# Patient Record
Sex: FEMALE | Race: BLACK OR AFRICAN AMERICAN | Employment: UNEMPLOYED | URBAN - METROPOLITAN AREA
[De-identification: names, ages, dates, MRNs, and addresses within clinical notes are randomized per-mention and may not be internally consistent; named-entity substitution may affect disease eponyms.]

---

## 2019-02-08 ENCOUNTER — TELEPHONE (OUTPATIENT)
Dept: ENT CLINIC | Age: 49
End: 2019-02-08

## 2019-04-16 ENCOUNTER — OFFICE VISIT (OUTPATIENT)
Dept: ENT CLINIC | Age: 49
End: 2019-04-16
Payer: COMMERCIAL

## 2019-04-16 VITALS
HEART RATE: 99 BPM | WEIGHT: 293 LBS | HEIGHT: 68 IN | BODY MASS INDEX: 44.41 KG/M2 | SYSTOLIC BLOOD PRESSURE: 154 MMHG | DIASTOLIC BLOOD PRESSURE: 89 MMHG

## 2019-04-16 DIAGNOSIS — R09.89 SENSATION OF FOREIGN BODY IN THROAT: Primary | ICD-10-CM

## 2019-04-16 PROCEDURE — 99214 OFFICE O/P EST MOD 30 MIN: CPT | Performed by: OTOLARYNGOLOGY

## 2019-04-16 NOTE — PROGRESS NOTES
[address] [facility] ENT       PCP:  Bette Fagan MD       CHIEF COMPLAINT:  Chief Complaint   Patient presents with    Nose Problem       HISTORY OF PRESENT ILLNESS:   (Location, Quality, Severity, Duration, Timing, Context, Modifying factors, Associated symptoms)  Mikie Oviedo is a 50 y.o. female, here today for evaluation of a problem located in her nose and throat. The quality is a sensation of something stuck in her nose and throat. The severity is mild. The duration is 2 to 2.5 months. The timing is constant. The context is after eating an apple with a label on it. She stated that she ate an apple, that had a sticker on it. \"The sticker that was on the apple got stuck up in my nose. It floats from my nose to my throat. \"Sometimes my ear aches and then I cluck and it moves back down. \"         REVIEW OF SYSTEMS:  CONSTITUTIONAL:  Denied fever and chills. Denied unexplained weight loss. EARS, NOSE, THROAT:  Denied otorrhea, otalgia, hearing loss, tinnitus, epistaxis, nasal dyspnea, rhinorrhea, sore throat and chronic hoarseness. PAST MEDICAL HISTORY:   Past Medical History:   Diagnosis Date    Anemia     Hypertension     Respiratory infection     4/2015        Past Surgical History:   Procedure Laterality Date    APPENDECTOMY      BREAST SURGERY      reduction    HYSTERECTOMY, TOTAL ABDOMINAL  5/14/2015          EXAMINATION:     VITALS SIGNS:    Vitals:    04/16/19 1546 04/16/19 1549   BP: (!) 154/100 (!) 154/89   Pulse: 106 99   Weight: (!) 354 lb (160.6 kg)    Height: 5' 8\" (1.727 m)       GENERAL APPEARANCE: Well developed, well nourished, no apparent distress, no apparent deformities. ABILITY TO COMMUNICATE/QUALITY OF VOICE:  Communicated without difficulty. Normal voice. INSPECTION, HEAD AND FACE: Normal overall appearance, with no scars, lesions or masses. SINUSES:   The maxillary and frontal sinuses were nontender, bilaterally.          EXTERNAL EAR/NOSE:  Normal pinnae and mastoids. Normal external nose. EARS, OTOSCOPY: The TMs and EACs appeared to be normal bilaterally. NOSE:   The nasal septum was midline. The inferior turbinates were normal.  The nasal mucosa and secretions were normal.  No pus or polyps were seen. LIPS, TEETH AND GUMS:  Unremarkable. OROPHARYNX/ORAL CAVITY:  Oral mucosa, hard and soft palates, tongue, and pharynx were normal.   (+) INDIRECT LARYNGOSCOPY:  Visualization was suboptimal due to a strong gag reflex and guarding. The base of tongue and epiglottis appeared to be normal.  Unable to visualize the vocal cords and hypopharynx, and endolarynx. NECK:  No masses or tenderness. No abnormal appearance, asymmetry or abnormal tracheal position. Laryngeal cartilages and hyoid bone were normal to palpation. LYMPH NODES, CERVICAL, FACIAL AND SUPRACLAVICULAR:  No lymphadenopathy. THYROID:  No nodules, enlargement, tenderness or masses. CRANIAL NERVES:  II - XII intact, with no apparent deficits. IMPRESSION / Josie Cheung / Fang Rome / PROCEDURES:       Irean Landau was seen today for nose problem. Diagnoses and all orders for this visit:    Sensation of foreign body in throat        RECOMMENDATIONS / PLAN:   Return for flexible fiberoptic nasopharyngolaryngoscopy.          >> Please note portions of this note were completed with a voice recognition program.  Efforts were made to edit the dictations but occasionally words are mis-transcribed.

## 2022-06-10 ENCOUNTER — HOSPITAL ENCOUNTER (OUTPATIENT)
Age: 52
Setting detail: OBSERVATION
Discharge: HOME | End: 2022-06-13
Attending: INTERNAL MEDICINE | Admitting: INTERNAL MEDICINE
Payer: COMMERCIAL

## 2022-06-10 DIAGNOSIS — I44.2 CHB (COMPLETE HEART BLOCK) (HCC): Primary | ICD-10-CM

## 2022-06-10 PROBLEM — R73.9 ACUTE HYPERGLYCEMIA: Status: ACTIVE | Noted: 2022-06-10

## 2022-06-10 PROBLEM — E66.01 MORBID OBESITY WITH BMI OF 50.0-59.9, ADULT (HCC): Status: ACTIVE | Noted: 2022-06-10

## 2022-06-10 PROBLEM — Z91.14 NONCOMPLIANCE WITH MEDICATION REGIMEN: Status: ACTIVE | Noted: 2022-06-10

## 2022-06-10 PROBLEM — I10 HTN (HYPERTENSION): Status: ACTIVE | Noted: 2022-06-10

## 2022-06-10 PROBLEM — Z91.148 NONCOMPLIANCE WITH MEDICATION REGIMEN: Status: ACTIVE | Noted: 2022-06-10

## 2022-06-10 PROBLEM — R07.9 CHEST PAIN WITH MODERATE RISK FOR CARDIAC ETIOLOGY: Status: ACTIVE | Noted: 2022-06-10

## 2022-06-10 LAB
AMPHETAMINE SCREEN, URINE: NORMAL
BARBITURATE SCREEN URINE: NORMAL
BASOPHILS ABSOLUTE: 0.1 K/UL (ref 0–0.2)
BASOPHILS RELATIVE PERCENT: 0.6 %
BENZODIAZEPINE SCREEN, URINE: NORMAL
CANNABINOID SCREEN URINE: NORMAL
COCAINE METABOLITE SCREEN URINE: NORMAL
D DIMER: 0.33 UG/ML FEU (ref 0–0.6)
EOSINOPHILS ABSOLUTE: 0.2 K/UL (ref 0–0.6)
EOSINOPHILS RELATIVE PERCENT: 1.4 %
GLUCOSE BLD-MCNC: 161 MG/DL (ref 70–99)
GLUCOSE BLD-MCNC: 202 MG/DL (ref 70–99)
HCT VFR BLD CALC: 37 % (ref 36–48)
HEMOGLOBIN: 11.8 G/DL (ref 12–16)
LYMPHOCYTES ABSOLUTE: 2.8 K/UL (ref 1–5.1)
LYMPHOCYTES RELATIVE PERCENT: 21.1 %
Lab: NORMAL
MAGNESIUM: 1.6 MG/DL (ref 1.8–2.4)
MCH RBC QN AUTO: 26.6 PG (ref 26–34)
MCHC RBC AUTO-ENTMCNC: 31.8 G/DL (ref 31–36)
MCV RBC AUTO: 83.7 FL (ref 80–100)
METHADONE SCREEN, URINE: NORMAL
MONOCYTES ABSOLUTE: 0.7 K/UL (ref 0–1.3)
MONOCYTES RELATIVE PERCENT: 5 %
NEUTROPHILS ABSOLUTE: 9.6 K/UL (ref 1.7–7.7)
NEUTROPHILS RELATIVE PERCENT: 71.9 %
OPIATE SCREEN URINE: NORMAL
OXYCODONE URINE: NORMAL
PDW BLD-RTO: 16 % (ref 12.4–15.4)
PERFORMED ON: ABNORMAL
PERFORMED ON: ABNORMAL
PH UA: 6
PHENCYCLIDINE SCREEN URINE: NORMAL
PLATELET # BLD: 414 K/UL (ref 135–450)
PMV BLD AUTO: 7.2 FL (ref 5–10.5)
PROPOXYPHENE SCREEN: NORMAL
RBC # BLD: 4.42 M/UL (ref 4–5.2)
TROPONIN: <0.01 NG/ML
TROPONIN: <0.01 NG/ML
WBC # BLD: 13.4 K/UL (ref 4–11)

## 2022-06-10 PROCEDURE — 93005 ELECTROCARDIOGRAM TRACING: CPT | Performed by: HOSPITALIST

## 2022-06-10 PROCEDURE — 84439 ASSAY OF FREE THYROXINE: CPT

## 2022-06-10 PROCEDURE — 96372 THER/PROPH/DIAG INJ SC/IM: CPT

## 2022-06-10 PROCEDURE — 6360000002 HC RX W HCPCS: Performed by: HOSPITALIST

## 2022-06-10 PROCEDURE — 83735 ASSAY OF MAGNESIUM: CPT

## 2022-06-10 PROCEDURE — G0379 DIRECT REFER HOSPITAL OBSERV: HCPCS

## 2022-06-10 PROCEDURE — 83036 HEMOGLOBIN GLYCOSYLATED A1C: CPT

## 2022-06-10 PROCEDURE — 6370000000 HC RX 637 (ALT 250 FOR IP): Performed by: HOSPITALIST

## 2022-06-10 PROCEDURE — 96365 THER/PROPH/DIAG IV INF INIT: CPT

## 2022-06-10 PROCEDURE — 84443 ASSAY THYROID STIM HORMONE: CPT

## 2022-06-10 PROCEDURE — 96366 THER/PROPH/DIAG IV INF ADDON: CPT

## 2022-06-10 PROCEDURE — 84484 ASSAY OF TROPONIN QUANT: CPT

## 2022-06-10 PROCEDURE — 2580000003 HC RX 258: Performed by: HOSPITALIST

## 2022-06-10 PROCEDURE — 36415 COLL VENOUS BLD VENIPUNCTURE: CPT

## 2022-06-10 PROCEDURE — 85025 COMPLETE CBC W/AUTO DIFF WBC: CPT

## 2022-06-10 PROCEDURE — G0378 HOSPITAL OBSERVATION PER HR: HCPCS

## 2022-06-10 PROCEDURE — 80307 DRUG TEST PRSMV CHEM ANLYZR: CPT

## 2022-06-10 PROCEDURE — 85379 FIBRIN DEGRADATION QUANT: CPT

## 2022-06-10 RX ORDER — ACETAMINOPHEN 650 MG/1
650 SUPPOSITORY RECTAL EVERY 6 HOURS PRN
Status: DISCONTINUED | OUTPATIENT
Start: 2022-06-10 | End: 2022-06-13 | Stop reason: HOSPADM

## 2022-06-10 RX ORDER — DEXTROSE MONOHYDRATE 50 MG/ML
100 INJECTION, SOLUTION INTRAVENOUS PRN
Status: DISCONTINUED | OUTPATIENT
Start: 2022-06-10 | End: 2022-06-13 | Stop reason: HOSPADM

## 2022-06-10 RX ORDER — INSULIN LISPRO 100 [IU]/ML
0-12 INJECTION, SOLUTION INTRAVENOUS; SUBCUTANEOUS
Status: DISCONTINUED | OUTPATIENT
Start: 2022-06-10 | End: 2022-06-13 | Stop reason: HOSPADM

## 2022-06-10 RX ORDER — POLYETHYLENE GLYCOL 3350 17 G/17G
17 POWDER, FOR SOLUTION ORAL DAILY PRN
Status: DISCONTINUED | OUTPATIENT
Start: 2022-06-10 | End: 2022-06-13 | Stop reason: HOSPADM

## 2022-06-10 RX ORDER — ACETAMINOPHEN 325 MG/1
650 TABLET ORAL EVERY 6 HOURS PRN
Status: DISCONTINUED | OUTPATIENT
Start: 2022-06-10 | End: 2022-06-13 | Stop reason: HOSPADM

## 2022-06-10 RX ORDER — SODIUM CHLORIDE 0.9 % (FLUSH) 0.9 %
5-40 SYRINGE (ML) INJECTION PRN
Status: DISCONTINUED | OUTPATIENT
Start: 2022-06-10 | End: 2022-06-13 | Stop reason: HOSPADM

## 2022-06-10 RX ORDER — INSULIN GLARGINE 100 [IU]/ML
20 INJECTION, SOLUTION SUBCUTANEOUS NIGHTLY
Status: DISCONTINUED | OUTPATIENT
Start: 2022-06-10 | End: 2022-06-13 | Stop reason: HOSPADM

## 2022-06-10 RX ORDER — MAGNESIUM SULFATE IN WATER 40 MG/ML
2000 INJECTION, SOLUTION INTRAVENOUS ONCE
Status: COMPLETED | OUTPATIENT
Start: 2022-06-10 | End: 2022-06-10

## 2022-06-10 RX ORDER — LANOLIN ALCOHOL/MO/W.PET/CERES
400 CREAM (GRAM) TOPICAL DAILY
Status: DISCONTINUED | OUTPATIENT
Start: 2022-06-10 | End: 2022-06-13 | Stop reason: HOSPADM

## 2022-06-10 RX ORDER — POTASSIUM CHLORIDE 7.45 MG/ML
10 INJECTION INTRAVENOUS PRN
Status: DISCONTINUED | OUTPATIENT
Start: 2022-06-10 | End: 2022-06-13

## 2022-06-10 RX ORDER — ASPIRIN 81 MG/1
81 TABLET, CHEWABLE ORAL DAILY
Status: DISCONTINUED | OUTPATIENT
Start: 2022-06-11 | End: 2022-06-13 | Stop reason: HOSPADM

## 2022-06-10 RX ORDER — ENOXAPARIN SODIUM 100 MG/ML
40 INJECTION SUBCUTANEOUS 2 TIMES DAILY
Status: DISCONTINUED | OUTPATIENT
Start: 2022-06-10 | End: 2022-06-13 | Stop reason: HOSPADM

## 2022-06-10 RX ORDER — ONDANSETRON 4 MG/1
4 TABLET, ORALLY DISINTEGRATING ORAL EVERY 8 HOURS PRN
Status: DISCONTINUED | OUTPATIENT
Start: 2022-06-10 | End: 2022-06-13 | Stop reason: HOSPADM

## 2022-06-10 RX ORDER — INSULIN LISPRO 100 [IU]/ML
0-6 INJECTION, SOLUTION INTRAVENOUS; SUBCUTANEOUS NIGHTLY
Status: DISCONTINUED | OUTPATIENT
Start: 2022-06-10 | End: 2022-06-13 | Stop reason: HOSPADM

## 2022-06-10 RX ORDER — POTASSIUM CHLORIDE 20 MEQ/1
40 TABLET, EXTENDED RELEASE ORAL PRN
Status: DISCONTINUED | OUTPATIENT
Start: 2022-06-10 | End: 2022-06-13

## 2022-06-10 RX ORDER — SODIUM CHLORIDE 9 MG/ML
INJECTION, SOLUTION INTRAVENOUS PRN
Status: DISCONTINUED | OUTPATIENT
Start: 2022-06-10 | End: 2022-06-13 | Stop reason: HOSPADM

## 2022-06-10 RX ORDER — SODIUM CHLORIDE 0.9 % (FLUSH) 0.9 %
5-40 SYRINGE (ML) INJECTION EVERY 12 HOURS SCHEDULED
Status: DISCONTINUED | OUTPATIENT
Start: 2022-06-10 | End: 2022-06-13 | Stop reason: HOSPADM

## 2022-06-10 RX ORDER — AMLODIPINE BESYLATE 5 MG/1
5 TABLET ORAL DAILY
Status: ON HOLD | COMMUNITY
End: 2022-06-13 | Stop reason: HOSPADM

## 2022-06-10 RX ORDER — ONDANSETRON 2 MG/ML
4 INJECTION INTRAMUSCULAR; INTRAVENOUS EVERY 6 HOURS PRN
Status: DISCONTINUED | OUTPATIENT
Start: 2022-06-10 | End: 2022-06-13 | Stop reason: HOSPADM

## 2022-06-10 RX ADMIN — MAGNESIUM SULFATE HEPTAHYDRATE 2000 MG: 40 INJECTION, SOLUTION INTRAVENOUS at 20:59

## 2022-06-10 RX ADMIN — INSULIN LISPRO 2 UNITS: 100 INJECTION, SOLUTION INTRAVENOUS; SUBCUTANEOUS at 17:56

## 2022-06-10 RX ADMIN — Medication 400 MG: at 21:02

## 2022-06-10 RX ADMIN — Medication 10 ML: at 20:42

## 2022-06-10 RX ADMIN — INSULIN LISPRO 2 UNITS: 100 INJECTION, SOLUTION INTRAVENOUS; SUBCUTANEOUS at 21:03

## 2022-06-10 RX ADMIN — ENOXAPARIN SODIUM 40 MG: 100 INJECTION SUBCUTANEOUS at 21:07

## 2022-06-10 RX ADMIN — ACETAMINOPHEN 650 MG: 325 TABLET ORAL at 18:35

## 2022-06-10 RX ADMIN — INSULIN GLARGINE 20 UNITS: 100 INJECTION, SOLUTION SUBCUTANEOUS at 21:02

## 2022-06-10 RX ADMIN — SODIUM CHLORIDE: 9 INJECTION, SOLUTION INTRAVENOUS at 19:00

## 2022-06-10 ASSESSMENT — LIFESTYLE VARIABLES: HOW OFTEN DO YOU HAVE A DRINK CONTAINING ALCOHOL: NEVER

## 2022-06-10 ASSESSMENT — PAIN DESCRIPTION - DESCRIPTORS: DESCRIPTORS: ACHING

## 2022-06-10 NOTE — LETTER
[facility]  [place] [facility] [employer]  [address] [address]  Dept: [phone]  Loc: [phone]                                                                     [facility]   [address] [place] [address]         Marshall Stock was admitted to [facility] on 6/10/2022 for treatment and evaluation. It is my medical opinion that patient may return to work on 6/15/2022.     Sincerely, Adam Shaw MD  [phone]

## 2022-06-10 NOTE — H&P
HOSPITALISTS HISTORY AND PHYSICAL    6/10/2022 5:20 PM    Patient Information:  Komal Rodrigez is a 46 y.o. female [id number]  PCP:  Eder Gutiérrez MD (Tel: [phone] )    Chief complaint:  No chief complaint on file. History of Present Illness:  Yuliya Johnson is a 46 y.o. female who arrived via transport from the [facility] to be evaluated for acute chest pain with witnessed CHB/4-second pause. She reports that her symptoms began yesterday with intermittent right-sided substernal chest pressure. She endorses diaphoresis, nausea, and anxiety. Patient with multiple cardiac risk factors including: Morbid obesity with BMI 54.74, HTN with med noncompliance, and acute hyperglycemia concerning for type II DM. History obtained from patient and review of Epic [facility]    REVIEW OF SYSTEMS:   Constitutional: Negative for fever,chills; positive generalized weakness  ENT: Negative for headache, rhinorrhea, and sore throat.   Respiratory: Positive for shortness of breath; denies wheezing, and cough  Cardiovascular: Positive right-sided  chest pain; denies palpitations; 2+ peripheral edema with orthopnea  Gastrointestinal: Chronic GERD symptoms; negative for N/V/D and abdominal pain; no hematemesis, hematochezia, or melena; no anorexia  Genitourinary: Nocturnal urinary frequency; negative for dysuria, frequency, retention; no incontinence  Hematologic/Lymphatic: Negative for bleeding tendency/excessive bruising  Musculoskeletal: Negative for myalgias and arthalgias; able to ambulate without difficulty  Neurologic: Negative for LOC, seizure activity, paresthesias, dysarthria, vertigo, and gait disturbance  Skin: Negative for itching,rash, decubitus  Psychiatric: Negative for depression,anxiety, and agitation; no hallucinations; denies SI/HI  Endocrine: Denies type II DM; positive polydipsia/polyphagia, polyuria    Past Medical History:   has a past medical history of Anemia, Hypertension, and Respiratory infection. Past Surgical History:   has a past surgical history that includes Breast surgery; Appendectomy; and Hysterectomy, total abdominal (5/14/2015). Medications:  No current facility-administered medications on file prior to encounter. Current Outpatient Medications on File Prior to Encounter   Medication Sig Dispense Refill    amLODIPine (NORVASC) 5 MG tablet Take 5 mg by mouth daily         Allergies: Allergies   Allergen Reactions    Promethazine Hives    Vistaril [Hydroxyzine Pamoate] Hives        Social History:   reports that she has never smoked. She has never used smokeless tobacco. She reports that she does not drink alcohol and does not use drugs. Family History:  family history includes Arthritis in an other family member; High Blood Pressure in her father, mother, and another family member.      Physical Exam:  Ht 5' 8\" (1.727 m)   Wt (!) 360 lb (163.3 kg)   LMP 04/19/2015   BMI 54.74 kg/m²     General appearance: Pleasant, morbidly obese female resting in bed, NAD  Eyes: Sclera clear without conjunctival injection; PERRLA; EOMI  ENT: Mucous membranes moist without thrush; normal dentition  Neck: Supple without meningismus; no goiter; no carotid bruit bilaterally  Cardiovascular: Regular rhythm without ectopy; normal S1-S2 with no murmurs; no peripheral edema; no JVD  Respiratory: No tachypnea; CTAB with adequate air exchange, no wheeze, rhonchi or rales; I:E intact  Gastrointestinal: Abdomen obese, non-tender, not distended; bowel sounds normal; no masses/organomegaly appreciated  Musculoskeletal: FROM spine and extremities x4; no gross deformity  Neurology: A&O x3; cranial nerves 2-12 grossly intact; motor 5/5  BUE/BLE; no seizure activity; finger-to-nose/heel-to-shin intact; no pronator drift  Psychiatry: Well-groomed with good eye contact; appropriate affect; no visual/auditory hallucination  Skin: Warm, dry, normal turgor, no rash  PV: 2/4 radial and dorsalis pedis bilaterally; brisk capillary refill    Labs:  CBC:   Lab Results   Component Value Date    WBC 15.8 05/15/2015    RBC 3.48 05/15/2015    HGB 8.0 05/15/2015    HCT 26.4 05/15/2015    MCV 75.7 05/15/2015    MCH 22.9 05/15/2015    MCHC 30.2 05/15/2015    RDW 17.8 05/15/2015     05/15/2015    MPV 7.5 05/15/2015     BMP:    Lab Results   Component Value Date     05/08/2015    K 4.2 05/08/2015     05/08/2015    CO2 24 05/08/2015    BUN 9 05/08/2015    CREATININE 0.7 05/08/2015    CALCIUM 9.5 05/08/2015    GFRAA >60 05/08/2015    LABGLOM >60 05/08/2015    GLUCOSE 108 05/08/2015     No orders to display         EKG:    Ventricular Rate 89 P BPM QTc Calculation (Bazett) 457 P ms   Atrial Rate 89 P BPM P Axis 48 P degrees   P-R Interval 158 P ms R Axis -2 P degrees   QRS Duration 94 P ms T Axis 25 P degrees   Q-T Interval 376 P ms Diagnosis Normal sinus rhythmInferior infarct (cited on or before 10-PAYAL-2022       I visualized CXR images and EKG strips personally and agree with documented interpretation    Discussed case  with ED provider    Problem List:  Principal Problem:    CHB (complete heart block) (HCC)  Active Problems: Morbid obesity with BMI of 50.0-59.9, adult (HCC)    Acute hyperglycemia    HTN (hypertension)    Noncompliance with medication regimen    LPRD (laryngopharyngeal reflux disease)  Resolved Problems:    * No resolved hospital problems.  *        Consults:  IP CONSULT TO CARDIOLOGY      Assessment/Plan:     Chest pain with elevated HEART score  -Admit to telemetry floor with serial cardiac enzymes to be obtained overnight  -ASA administered in ED and to be continued at 81 mg PO QAM  -High dose statin therapy initiated overnight; obtain FLP in a.m.  -Nitropaste applied to chest wall (BP permitting)  -ECHO scheduled for a.m.  -Lexiscan nuclear stress testing scheduled for a.m.  -Therapeutic anticoagulation not indicated at this time  -Consultation placed to cardiologist for further recommendations    CHB with 4-second pause  -Patient completely asymptomatic when this occurred  - UDS, thyroid studies and electrolytes pending  -Patient to be monitored via telemetry during stay; pacer pads to be applied to chest wall with transcutaneous pacer near bedside  -Consult placed to EP CARDS for further recommendations    HTN  -Patient admitted to telemetry floor for continuous monitoring during stay  -EKG obtained in ED reviewed personally and found to be without evidence of LAD or acute ischemia  -Patient reports that she is prescribed Norvasc but has been noncompliant for greater than 1 year  -Patient may benefit from addition of ACE RI due to suspicion for occult diabetes  -ECHO scheduled in a.m. to further assess cardiac structure and function    Acute hyperglycemia with BMI 54.74  -Random POC glucose 257 highly suggestive of occult DM; A1c ordered with results pending  -Patient denies being diabetic and is on no oral hypoglycemic agents  -Weight-based basal insulin initiated QHS  -PRN Humalog medium dose SSI scheduled before meals and at bedtime based on POC glucose  -Carbohydrate restriction placed on diet  -Consultation placed to Nutrition for ADA dietary education    DVT prophylaxis-Lovenox 30 mg subcu twice daily  Code status-full code  Diet-cardiac KIANA with carb restrictions  IV access-PIV established in ED      Admit as observation. I anticipate hospitalization spanning less than two midnights for investigation and treatment of the above medically necessary diagnoses. Comment: Please note this report has been produced using speech recognition software and may contain errors related to that system including errors in grammar, punctuation, and spelling, as well as words and phrases that may be inappropriate.  If there are any questions or concerns please feel free to contact the dictating provider for clarification.          Yaquelin Vazquez MD    6/10/2022 5:20 PM

## 2022-06-10 NOTE — PROGRESS NOTES
Patient admitted to room from ED. report received. Patient oriented to room, call light, bed rails, phone, lights and bathroom. Patient instructed about schedule of the day including:  Vital sign, frequency, lab draws, possible tests, frequency of MD and staff rounds. Patient instructed about precribed diet, how/when to call for meal service, and television. Telemetry box in place, patient aware of placement and reason. Bed locked, in lowest position, side rails up 2/4, call light within reach.

## 2022-06-10 NOTE — PROGRESS NOTES
4 Eyes Skin Assessment     NAME:  Libia Ku  YOB: 1970  MEDICAL RECORD NUMBER:  [id number]    The patient is being assess for  Admission    I agree that 2 RN's have performed a thorough Head to Toe Skin Assessment on the patient. ALL assessment sites listed below have been assessed. Areas assessed by both nurses:    Head, Face, Ears, Shoulders, Back, Chest, Arms, Elbows, Hands, Sacrum. Buttock, Coccyx, Ischium and Legs. Feet and Heels        Does the Patient have a Wound?  No noted wound(s)       Sudarshan Prevention initiated:  NA   Wound Care Orders initiated:  NA    Pressure Injury (Stage 3,4, Unstageable, DTI, NWPT, and Complex wounds) if present place consult order under [de-identified] NA    New and Established Ostomies if present place consult order under : NA      Nurse 1 eSignature: Electronically signed by Josie Rose RN on 6/10/22 at 7:21 PM EDT    **SHARE this note so that the co-signing nurse is able to place an eSignature**    Nurse 2 eSignature: Electronically signed by Chelsie Parikh RN on 6/10/22 at 7:42 PM EDT

## 2022-06-10 NOTE — PROGRESS NOTES
Pharmacist Review and Automatic Dose Adjustment of Prophylactic Enoxaparin    *Review reason for admission/hospital problem list*    The reviewing pharmacist has made an adjustment to the ordered enoxaparin dose or converted to UFH per the approved [facility] protocol and table as identified below. Deana Dumont is a 46 y.o. female. No results for input(s): CREATININE in the last 72 hours. CrCl cannot be calculated (Patient's most recent lab result is older than the maximum 180 days allowed. ). Recent Labs     06/10/22  1718   HGB 11.8*   HCT 37.0        No results for input(s): INR in the last 72 hours. Height:   Ht Readings from Last 1 Encounters:   06/10/22 5' 8\" (1.727 m)     Weight:  Wt Readings from Last 1 Encounters:   06/10/22 (!) 360 lb (163.3 kg)               Plan: Based upon the patient's weight and renal function, the ordered enoxaparin dose of 30 mg twice daily has been changed to 40 mg twice daily.       Thank you,  Florian Lambert, [facility]  6/10/2022, 6:08 PM

## 2022-06-11 ENCOUNTER — APPOINTMENT (OUTPATIENT)
Dept: NUCLEAR MEDICINE | Age: 52
End: 2022-06-11
Attending: INTERNAL MEDICINE
Payer: COMMERCIAL

## 2022-06-11 LAB
ANION GAP SERPL CALCULATED.3IONS-SCNC: 8 MMOL/L (ref 3–16)
BUN BLDV-MCNC: 7 MG/DL (ref 7–20)
CALCIUM SERPL-MCNC: 9.3 MG/DL (ref 8.3–10.6)
CHLORIDE BLD-SCNC: 97 MMOL/L (ref 99–110)
CHOLESTEROL, TOTAL: 215 MG/DL (ref 0–199)
CO2: 32 MMOL/L (ref 21–32)
CREAT SERPL-MCNC: <0.5 MG/DL (ref 0.6–1.1)
EKG ATRIAL RATE: 88 BPM
EKG ATRIAL RATE: 89 BPM
EKG DIAGNOSIS: NORMAL
EKG DIAGNOSIS: NORMAL
EKG P AXIS: 48 DEGREES
EKG P AXIS: 53 DEGREES
EKG P-R INTERVAL: 158 MS
EKG P-R INTERVAL: 168 MS
EKG Q-T INTERVAL: 376 MS
EKG Q-T INTERVAL: 376 MS
EKG QRS DURATION: 84 MS
EKG QRS DURATION: 94 MS
EKG QTC CALCULATION (BAZETT): 454 MS
EKG QTC CALCULATION (BAZETT): 457 MS
EKG R AXIS: -18 DEGREES
EKG R AXIS: -2 DEGREES
EKG T AXIS: 25 DEGREES
EKG T AXIS: 25 DEGREES
EKG VENTRICULAR RATE: 88 BPM
EKG VENTRICULAR RATE: 89 BPM
ESTIMATED AVERAGE GLUCOSE: 237.4 MG/DL
GFR AFRICAN AMERICAN: >60
GFR NON-AFRICAN AMERICAN: >60
GLUCOSE BLD-MCNC: 167 MG/DL (ref 70–99)
GLUCOSE BLD-MCNC: 205 MG/DL (ref 70–99)
GLUCOSE BLD-MCNC: 225 MG/DL (ref 70–99)
GLUCOSE BLD-MCNC: 250 MG/DL (ref 70–99)
GLUCOSE BLD-MCNC: 340 MG/DL (ref 70–99)
HBA1C MFR BLD: 9.9 %
HCT VFR BLD CALC: 38.4 % (ref 36–48)
HDLC SERPL-MCNC: 43 MG/DL (ref 40–60)
HEMOGLOBIN: 12.2 G/DL (ref 12–16)
LDL CHOLESTEROL CALCULATED: 148 MG/DL
LV EF: 60 %
LVEF MODALITY: NORMAL
MCH RBC QN AUTO: 26.5 PG (ref 26–34)
MCHC RBC AUTO-ENTMCNC: 31.6 G/DL (ref 31–36)
MCV RBC AUTO: 83.7 FL (ref 80–100)
PDW BLD-RTO: 15.7 % (ref 12.4–15.4)
PERFORMED ON: ABNORMAL
PLATELET # BLD: 399 K/UL (ref 135–450)
PMV BLD AUTO: 7.2 FL (ref 5–10.5)
POTASSIUM SERPL-SCNC: 4.8 MMOL/L (ref 3.5–5.1)
RBC # BLD: 4.59 M/UL (ref 4–5.2)
SODIUM BLD-SCNC: 137 MMOL/L (ref 136–145)
T4 FREE: 1.3 NG/DL (ref 0.9–1.8)
TRIGL SERPL-MCNC: 121 MG/DL (ref 0–150)
TSH SERPL DL<=0.05 MIU/L-ACNC: 1.16 UIU/ML (ref 0.27–4.2)
VLDLC SERPL CALC-MCNC: 24 MG/DL
WBC # BLD: 11.5 K/UL (ref 4–11)

## 2022-06-11 PROCEDURE — 93010 ELECTROCARDIOGRAM REPORT: CPT | Performed by: INTERNAL MEDICINE

## 2022-06-11 PROCEDURE — 96372 THER/PROPH/DIAG INJ SC/IM: CPT

## 2022-06-11 PROCEDURE — 85027 COMPLETE CBC AUTOMATED: CPT

## 2022-06-11 PROCEDURE — 78452 HT MUSCLE IMAGE SPECT MULT: CPT

## 2022-06-11 PROCEDURE — 36415 COLL VENOUS BLD VENIPUNCTURE: CPT

## 2022-06-11 PROCEDURE — 93005 ELECTROCARDIOGRAM TRACING: CPT | Performed by: HOSPITALIST

## 2022-06-11 PROCEDURE — 2580000003 HC RX 258: Performed by: HOSPITALIST

## 2022-06-11 PROCEDURE — 6360000002 HC RX W HCPCS: Performed by: INTERNAL MEDICINE

## 2022-06-11 PROCEDURE — 93017 CV STRESS TEST TRACING ONLY: CPT

## 2022-06-11 PROCEDURE — 3430000000 HC RX DIAGNOSTIC RADIOPHARMACEUTICAL: Performed by: INTERNAL MEDICINE

## 2022-06-11 PROCEDURE — 99205 OFFICE O/P NEW HI 60 MIN: CPT | Performed by: INTERNAL MEDICINE

## 2022-06-11 PROCEDURE — A9502 TC99M TETROFOSMIN: HCPCS | Performed by: INTERNAL MEDICINE

## 2022-06-11 PROCEDURE — 6360000002 HC RX W HCPCS: Performed by: HOSPITALIST

## 2022-06-11 PROCEDURE — 80048 BASIC METABOLIC PNL TOTAL CA: CPT

## 2022-06-11 PROCEDURE — 80061 LIPID PANEL: CPT

## 2022-06-11 PROCEDURE — 6370000000 HC RX 637 (ALT 250 FOR IP): Performed by: INTERNAL MEDICINE

## 2022-06-11 PROCEDURE — 6370000000 HC RX 637 (ALT 250 FOR IP): Performed by: HOSPITALIST

## 2022-06-11 PROCEDURE — G0378 HOSPITAL OBSERVATION PER HR: HCPCS

## 2022-06-11 RX ORDER — AMLODIPINE BESYLATE 5 MG/1
5 TABLET ORAL DAILY
Status: DISCONTINUED | OUTPATIENT
Start: 2022-06-11 | End: 2022-06-13

## 2022-06-11 RX ADMIN — ASPIRIN 81 MG: 81 TABLET, CHEWABLE ORAL at 08:19

## 2022-06-11 RX ADMIN — TETROFOSMIN 30.1 MILLICURIE: 1.38 INJECTION, POWDER, LYOPHILIZED, FOR SOLUTION INTRAVENOUS at 11:00

## 2022-06-11 RX ADMIN — Medication 10 ML: at 08:19

## 2022-06-11 RX ADMIN — ENOXAPARIN SODIUM 40 MG: 100 INJECTION SUBCUTANEOUS at 22:09

## 2022-06-11 RX ADMIN — ENOXAPARIN SODIUM 40 MG: 100 INJECTION SUBCUTANEOUS at 08:19

## 2022-06-11 RX ADMIN — Medication 10 ML: at 22:14

## 2022-06-11 RX ADMIN — Medication 400 MG: at 08:19

## 2022-06-11 RX ADMIN — AMLODIPINE BESYLATE 5 MG: 5 TABLET ORAL at 16:04

## 2022-06-11 RX ADMIN — INSULIN GLARGINE 20 UNITS: 100 INJECTION, SOLUTION SUBCUTANEOUS at 22:09

## 2022-06-11 RX ADMIN — INSULIN LISPRO 8 UNITS: 100 INJECTION, SOLUTION INTRAVENOUS; SUBCUTANEOUS at 17:38

## 2022-06-11 RX ADMIN — INSULIN LISPRO 2 UNITS: 100 INJECTION, SOLUTION INTRAVENOUS; SUBCUTANEOUS at 22:10

## 2022-06-11 RX ADMIN — REGADENOSON 0.4 MG: 0.08 INJECTION, SOLUTION INTRAVENOUS at 11:00

## 2022-06-11 ASSESSMENT — ENCOUNTER SYMPTOMS: TACHYPNEA: 1

## 2022-06-11 NOTE — PROGRESS NOTES
Hospitalist Progress Note      PCP: Teresa Maki MD    Date of Admission: 6/10/2022    Chief Complaint: chest pain    Hospital Course: h and p reviewed     Subjective: no CP /SOB or syncope       Medications:  Reviewed    Infusion Medications    sodium chloride 25 mL/hr at 06/10/22 1900    dextrose       Scheduled Medications    sodium chloride flush  5-40 mL IntraVENous 2 times per day    aspirin  81 mg Oral Daily    enoxaparin  40 mg SubCUTAneous BID    insulin lispro  0-12 Units SubCUTAneous TID WC    insulin lispro  0-6 Units SubCUTAneous Nightly    insulin glargine  20 Units SubCUTAneous Nightly    magnesium oxide  400 mg Oral Daily     PRN Meds: sodium chloride flush, sodium chloride, ondansetron **OR** ondansetron, acetaminophen **OR** acetaminophen, polyethylene glycol, perflutren lipid microspheres, potassium chloride **OR** potassium alternative oral replacement **OR** potassium chloride, glucose, dextrose bolus **OR** dextrose bolus, glucagon (rDNA), dextrose      Intake/Output Summary (Last 24 hours) at 6/11/2022 0915  Last data filed at 6/10/2022 2005  Gross per 24 hour   Intake 120 ml   Output --   Net 120 ml       Physical Exam Performed:    BP (!) 143/90   Pulse 96   Temp 98 °F (36.7 °C) (Oral)   Resp 20   Ht 5' 8\" (1.727 m)   Wt (!) 360 lb (163.3 kg)   LMP 04/19/2015   SpO2 95%   BMI 54.74 kg/m²     General appearance: No apparent distress, appears stated age and cooperative. HEENT:. Conjunctivae/corneas clear. Neck: Supple, with full range of motion. No jugular venous distention. Trachea midline. Respiratory:  Normal respiratory effort. Clear to auscultation, bilaterally without Rales/Wheezes/Rhonchi. Cardiovascular: Regular rate and rhythm with normal S1/S2 without murmurs, rubs or gallops. Abdomen: Soft, non-tender, non-distended with normal bowel sounds. Musculoskeletal: No clubbing, cyanosis or edema bilaterally. Full range of motion without deformity.   Skin: Skin color, texture, turgor normal.  No rashes or lesions. Neurologic:  Neurovascularly intact without any focal sensory/motor deficits. .  Psychiatric: Alert and oriented, thought content appropriate, normal insight  Capillary Refill: Brisk,3 seconds, normal   Peripheral Pulses: +2 palpable, equal bilaterally       Labs:   Recent Labs     06/10/22  1718 06/11/22  0637   WBC 13.4* 11.5*   HGB 11.8* 12.2   HCT 37.0 38.4    399     Recent Labs     06/11/22 0637      K 4.8   CL 97*   CO2 32   BUN 7   CREATININE <0.5*   CALCIUM 9.3     No results for input(s): AST, ALT, BILIDIR, BILITOT, ALKPHOS in the last 72 hours. No results for input(s): INR in the last 72 hours.   Recent Labs     06/10/22  1718 06/10/22  2008   TROPONINI <0.01 <0.01       Urinalysis:    No results found for: Mijares Goldmann, BACTERIA, RBCUA, BLOODU, SPECGRAV, Veren[address] [place] [address]    Radiology:  NM Cardiac Stress Test Nuclear Imaging    (Results Pending)           Assessment/Plan:    Active Hospital Problems    Diagnosis     CHB (complete heart block) ([facility] [address] [I44.2]      Priority: Medium    Morbid obesity with BMI of 50.0-59.9, [facility] [E66.01, Z68.43]      Priority: Medium    Acute hyperglycemia [R73.9]      Priority: Medium    HTN (hypertension) [I10]      Priority: Medium    Noncompliance with medication regimen [Z91.14]      Priority: Medium    Chest pain with moderate risk for cardiac etiology [R07.9]      Priority: Medium    LPRD (laryngopharyngeal reflux disease) [K21.9]      Chest pain with elevated HEART score- currently asymptomatic   -Admit to telemetry floor with serial cardiac enzymes to be obtained overnight  -ASA administered in ED and to be continued at 81 mg PO QAM  -High dose statin therapy initiated overnight; obtain FLP in a.m.  -Nitropaste applied to chest wall (BP permitting)  -ECHO scheduled for a.m.  -Lexiscan nuclear stress testing scheduled for a.m.  -Therapeutic anticoagulation not indicated at this time  -Consultation placed to cardiologist for further recommendations     CHB with 4-second pause  -Patient completely asymptomatic when this occurred  - UDS, thyroid studies and electrolytes pending  -Patient to be monitored via telemetry during stay; pacer pads to be applied to chest wall with transcutaneous pacer near bedside  -Consult placed to EP CARDS for further recommendations     HTN  -Patient admitted to telemetry floor for continuous monitoring during stay  -EKG obtained in ED reviewed personally and found to be without evidence of LAD or acute ischemia  -Patient reports that she is prescribed Norvasc but has been noncompliant for greater than 1 year  -Patient may benefit from addition of ACE RI due to suspicion for occult diabetes  -ECHO scheduled in a.m. to further assess cardiac structure and function     Acute hyperglycemia with BMI 54.74  -Random POC glucose 257 highly suggestive of occult DM; A1c ordered with results pending  -Patient denies being diabetic and is on no oral hypoglycemic agents  -Weight-based basal insulin initiated QHS  -PRN Humalog medium dose SSI scheduled before meals and at bedtime based on POC glucose  -Carbohydrate restriction placed on diet  -Consultation placed to Nutrition for ADA dietary education    DVT Prophylaxis: Lovenox   Diet: Diet NPO  Diet NPO  Code Status: Full Code    PT/OT Eval Status:     Dispo - pt may stay for 2 nd part of stress test till Kimmy Gaston MD

## 2022-06-11 NOTE — CONSULTS
Consult placed    [facility], [address]  Date:6/11/2022,  Time:7:40 AM        Electronically signed by Ney Ceballos on 6/11/2022 at 7:40 AM

## 2022-06-11 NOTE — PROGRESS NOTES
Stress Lab: Pt states she cannot walk on TM - GXT Myoview converted to a [place]. Due to [facility] protocol, the test needs to be divided into 2 days - MD, Pt, and Staff aware. Stress portion of test completed today, will complete resting portion of test Monday 6/13 a.m. Pt tolerated stress portion of test well.

## 2022-06-12 LAB
GLUCOSE BLD-MCNC: 187 MG/DL (ref 70–99)
GLUCOSE BLD-MCNC: 192 MG/DL (ref 70–99)
GLUCOSE BLD-MCNC: 194 MG/DL (ref 70–99)
GLUCOSE BLD-MCNC: 284 MG/DL (ref 70–99)
PERFORMED ON: ABNORMAL

## 2022-06-12 PROCEDURE — 6370000000 HC RX 637 (ALT 250 FOR IP): Performed by: HOSPITALIST

## 2022-06-12 PROCEDURE — 6360000002 HC RX W HCPCS: Performed by: INTERNAL MEDICINE

## 2022-06-12 PROCEDURE — 2580000003 HC RX 258: Performed by: HOSPITALIST

## 2022-06-12 PROCEDURE — 6370000000 HC RX 637 (ALT 250 FOR IP): Performed by: INTERNAL MEDICINE

## 2022-06-12 PROCEDURE — 96375 TX/PRO/DX INJ NEW DRUG ADDON: CPT

## 2022-06-12 PROCEDURE — G0378 HOSPITAL OBSERVATION PER HR: HCPCS

## 2022-06-12 PROCEDURE — 6360000002 HC RX W HCPCS: Performed by: HOSPITALIST

## 2022-06-12 PROCEDURE — 96372 THER/PROPH/DIAG INJ SC/IM: CPT

## 2022-06-12 RX ORDER — HYDRALAZINE HYDROCHLORIDE 20 MG/ML
10 INJECTION INTRAMUSCULAR; INTRAVENOUS EVERY 6 HOURS PRN
Status: DISCONTINUED | OUTPATIENT
Start: 2022-06-12 | End: 2022-06-13 | Stop reason: HOSPADM

## 2022-06-12 RX ADMIN — Medication 400 MG: at 08:03

## 2022-06-12 RX ADMIN — Medication 10 ML: at 08:06

## 2022-06-12 RX ADMIN — ENOXAPARIN SODIUM 40 MG: 100 INJECTION SUBCUTANEOUS at 20:14

## 2022-06-12 RX ADMIN — INSULIN LISPRO 2 UNITS: 100 INJECTION, SOLUTION INTRAVENOUS; SUBCUTANEOUS at 12:44

## 2022-06-12 RX ADMIN — ENOXAPARIN SODIUM 40 MG: 100 INJECTION SUBCUTANEOUS at 08:03

## 2022-06-12 RX ADMIN — INSULIN GLARGINE 20 UNITS: 100 INJECTION, SOLUTION SUBCUTANEOUS at 22:33

## 2022-06-12 RX ADMIN — INSULIN LISPRO 3 UNITS: 100 INJECTION, SOLUTION INTRAVENOUS; SUBCUTANEOUS at 22:33

## 2022-06-12 RX ADMIN — ASPIRIN 81 MG: 81 TABLET, CHEWABLE ORAL at 08:03

## 2022-06-12 RX ADMIN — INSULIN LISPRO 2 UNITS: 100 INJECTION, SOLUTION INTRAVENOUS; SUBCUTANEOUS at 09:21

## 2022-06-12 RX ADMIN — AMLODIPINE BESYLATE 5 MG: 5 TABLET ORAL at 08:03

## 2022-06-12 RX ADMIN — HYDRALAZINE HYDROCHLORIDE 10 MG: 20 INJECTION INTRAMUSCULAR; INTRAVENOUS at 17:10

## 2022-06-12 RX ADMIN — INSULIN LISPRO 2 UNITS: 100 INJECTION, SOLUTION INTRAVENOUS; SUBCUTANEOUS at 17:50

## 2022-06-12 RX ADMIN — Medication 10 ML: at 20:15

## 2022-06-12 NOTE — PROGRESS NOTES
Hospitalist Progress Note      PCP: Efra Nunez MD    Date of Admission: 6/10/2022    Chief Complaint: chest pain    Hospital Course: h and p reviewed     Subjective: no CP /SOB or syncope       Medications:  Reviewed    Infusion Medications    sodium chloride 25 mL/hr at 06/10/22 1900    dextrose       Scheduled Medications    amLODIPine  5 mg Oral Daily    sodium chloride flush  5-40 mL IntraVENous 2 times per day    aspirin  81 mg Oral Daily    enoxaparin  40 mg SubCUTAneous BID    insulin lispro  0-12 Units SubCUTAneous TID WC    insulin lispro  0-6 Units SubCUTAneous Nightly    insulin glargine  20 Units SubCUTAneous Nightly    magnesium oxide  400 mg Oral Daily     PRN Meds: sodium chloride flush, sodium chloride, ondansetron **OR** ondansetron, acetaminophen **OR** acetaminophen, polyethylene glycol, perflutren lipid microspheres, potassium chloride **OR** potassium alternative oral replacement **OR** potassium chloride, glucose, dextrose bolus **OR** dextrose bolus, glucagon (rDNA), dextrose      Intake/Output Summary (Last 24 hours) at 6/12/2022 0853  Last data filed at 6/11/2022 1830  Gross per 24 hour   Intake 240 ml   Output --   Net 240 ml       Physical Exam Performed:    BP (!) 143/76   Pulse 86   Temp 98.3 °F (36.8 °C) (Oral)   Resp 22   Ht 5' 8\" (1.727 m)   Wt (!) 362 lb (164.2 kg)   LMP 04/19/2015   SpO2 95%   BMI 55.04 kg/m²     General appearance: No apparent distress, appears stated age and cooperative. HEENT:. Conjunctivae/corneas clear. Neck: Supple, with full range of motion. No jugular venous distention. Trachea midline. Respiratory:  Normal respiratory effort. Clear to auscultation, bilaterally without Rales/Wheezes/Rhonchi. Cardiovascular: Regular rate and rhythm with normal S1/S2 without murmurs, rubs or gallops. Abdomen: Soft, non-tender, non-distended with normal bowel sounds. Musculoskeletal: No clubbing, cyanosis or edema bilaterally.   Full range of motion without deformity. Skin: Skin color, texture, turgor normal.  No rashes or lesions. Neurologic:  Neurovascularly intact without any focal sensory/motor deficits. .  Psychiatric: Alert and oriented, thought content appropriate, normal insight  Capillary Refill: Brisk,3 seconds, normal   Peripheral Pulses: +2 palpable, equal bilaterally       Labs:   Recent Labs     06/10/22  1718 06/11/22  0637   WBC 13.4* 11.5*   HGB 11.8* 12.2   HCT 37.0 38.4    399     Recent Labs     06/11/22  0637      K 4.8   CL 97*   CO2 32   BUN 7   CREATININE <0.5*   CALCIUM 9.3     No results for input(s): AST, ALT, BILIDIR, BILITOT, ALKPHOS in the last 72 hours. No results for input(s): INR in the last 72 hours.   Recent Labs     06/10/22  1718 06/10/22  2008   TROPONINI <0.01 <0.01       Urinalysis:    No results found for: Britton Parent, BACTERIA, RBCUA, BLOODU, SPECGRAV, Verenice [place] [address]    Radiology:  NM Cardiac Stress Test Nuclear Imaging    (Results Pending)           Assessment/Plan:    Active Hospital Problems    Diagnosis     CHB (complete heart block) ([facility] [I44.2]      Priority: Medium    Morbid obesity with BMI of 50.0-59.9, adult ([facility] [E66.01, Z68.43]      Priority: Medium    Acute hyperglycemia [R73.9]      Priority: Medium    HTN (hypertension) [I10]      Priority: Medium    Noncompliance with medication regimen [Z91.14]      Priority: Medium    Chest pain with moderate risk for cardiac etiology [R07.9]      Priority: Medium    LPRD (laryngopharyngeal reflux disease) [K21.9]      Chest pain with elevated HEART score- currently asymptomatic   -Admit to telemetry floor with serial cardiac enzymes to be obtained overnight  -ASA administered in ED and to be continued at 81 mg PO QAM  -High dose statin therapy initiated overnight; obtain FLP in a.m.  -Nitropaste applied to chest wall (BP permitting)  -ECHO scheduled for a.m.  -Lexiscan nuclear stress testing  Part I 6/11 - part 2 - 6/13 .  -Therapeutic anticoagulation not indicated at this time  -Consultation placed to cardiologist , input appreciated      CHB with 4-second pause   -Patient completely asymptomatic when this occurred  - UDS- neg , thyroid studies and electrolytes - WNL   -Patient to be monitored via telemetry during stay; pacer pads to be applied to chest wall with transcutaneous pacer near bedside  -Consult placed to EP CARDS for further recommendations  Cardio input appreciated    intermittent Mobitz type 1 AV block  No  evidence of Mobitz type 2 or third-degree AV block.    -Will continue to avoid AV-isaura blocking agents for now     HTN  -Patient admitted to telemetry floor for continuous monitoring during stay  -EKG obtained in ED reviewed personally and found to be without evidence of LAD or acute ischemia  -Patient reports that she is prescribed Norvasc but has been noncompliant for greater than 1 year  -Patient may benefit from addition of ACE RI due to suspicion for occult diabetes- consider after ischemia w/u   hydralazine prn   -ECHO scheduled      Acute hyperglycemia with BMI 54.74 - newly diagnosed DM   - ; A1c  9.9   -Patient denies being diabetic and is on no oral hypoglycemic agents  -Weight-based basal insulin initiated QHS  -PRN Humalog medium dose SSI scheduled before meals and at bedtime based on POC glucose  -Carbohydrate restriction placed on diet  -Consultation placed to Nutrition for ADA dietary education   - may need antidiabetic meds on dc     DVT Prophylaxis: Lovenox   Diet: ADULT DIET; Regular; 4 carb choices (60 gm/meal); Low Fat/Low Chol/High Fiber/2 gm Na;  No Caffeine  Diet NPO  Code Status: Full Code    PT/OT Eval Status:     Dispo - pt may stay for 2 nd part of stress test till Monday 6/13     Ofelia Martell MD

## 2022-06-12 NOTE — CARE COORDINATION
CASE MANAGEMENT INITIAL ASSESSMENT      Reviewed chart and completed assessment with patient:bedside  Family present: none  Explained Case Management role/services. Primary contact information:[facility] Decision Maker :   Primary Decision Maker: Ezequiel Vences - Domestic Partner - [phone]          Can this person be reached and be able to respond quickly, such as within a few minutes or hours? Yes      Admit date/status:6/10/22  Diagnosis:CHB   Is this a Readmission?:  No      Insurance:none listed, reported has BCBS   Precert required for SNF: Yes       3 night stay required: No    Living arrangements, Adls, care needs, prior to admission:lives in ranch style home with SO. [address] at home:  Walker__Cane__RTS__ BSC__Shower Chair__  02__ HHN__ CPAP__  BiPap__  Hospital Bed__ W/C___ Other_____    Services in the home and/or outpatient, prior to admission:none    Current PCP:none currently provided PCP information                                Medications Prescription coverage? yes    Transportation needs: none     PT/OT recs:none    Hospital Exemption Notification (HEN):needed for SNF    Barriers to discharge:none    Plan/comments:spoke with patient. Plans on returning home at d/c. IPTA. Will be able to get to ay follow up appts. Denied any DCP needs.  Needs ECHO and Lexiscan in am. Daisy Maldonado, RN       ECOC on chart for MD signature

## 2022-06-12 NOTE — PLAN OF CARE
Problem: Discharge Planning  Goal: Discharge to home or other facility with appropriate resources  6/12/2022 0053 by Amanda Gonzalez RN  Outcome: Progressing  6/11/2022 1754 by Frank Martinez RN  Outcome: Progressing     Problem: Neurosensory - Adult  Goal: Achieves stable or improved neurological status  Outcome: Progressing     Problem: Respiratory - Adult  Goal: Achieves optimal ventilation and oxygenation  Outcome: Progressing     Problem: Cardiovascular - Adult  Goal: Maintains optimal cardiac output and hemodynamic stability  Outcome: Progressing  Goal: Absence of cardiac dysrhythmias or at baseline  Outcome: Progressing     Problem: Skin/Tissue Integrity - Adult  Goal: Skin integrity remains intact  Outcome: Progressing     Problem: Genitourinary - Adult  Goal: Absence of urinary retention  Outcome: Progressing     Problem: Hematologic - Adult  Goal: Maintains hematologic stability  Outcome: Progressing

## 2022-06-12 NOTE — CONSULTS
[facility]  [phone]      Attending Physician: Ez Bullock MD  Reason for Consultation/Chief Complaint: Chest pain, AV block    Subjective   History of Present Illness:  Javi Ayoub is a 46 y.o. female with a history of morbid obesity and essential hypertension transferred for further evaluation of chest pain and AV block. The patient works at the [address] [place] [address] and says that while sitting at work yesterday, she developed sudden onset dull substernal chest pain. She denies any associated lightheadedness, dizziness, palpitations, or shortness of breath. A co-worker checked her blood pressure and she says that it was elevated. She says that she was given IV labetalol and while being monitored on telemetry was noted to have a 4-second pause. On review of her available telemetry strips from the [address] [place] [address] it appears that she had some intermittent Mobitz type 1 AV block and had a period with 3 consecutive non-conducted P waves. She again denied any associated lightheadedness, dizziness, or syncope. She was subsequently transferred to [address] [place] [address] for further evaluation. Since her arrival, she has been hemodynamically stable and has not had any recurrent chest pain. Review of her telemetry shows brief intermittent Mobitz I  AV block. There has not been any other evidence of Mobitz II AV block or complete heart block. Her troponins have been negative and EKG is without ischemic changes. Past Medical History:   has a past medical history of Anemia, Hypertension, and Respiratory infection. Surgical History:   has a past surgical history that includes Breast surgery; Appendectomy; and Hysterectomy, total abdominal (5/14/2015). Social History:   reports that she has never smoked. She has never used smokeless tobacco. She reports that she does not drink alcohol and does not use drugs.      Family History:  family history includes Arthritis in an other family member; High Blood Pressure in her father, mother, and another family member. Home Medications:  Were reviewed and are listed in nursing record and/or below  Prior to Admission medications    Medication Sig Start Date End Date Taking? Authorizing Provider   amLODIPine (NORVASC) 5 MG tablet Take 5 mg by mouth daily   Yes Historical Provider, MD        CURRENT Medications:  amLODIPine (NORVASC) tablet 5 mg, Daily  sodium chloride flush 0.9 % injection 5-40 mL, 2 times per day  sodium chloride flush 0.9 % injection 5-40 mL, PRN  0.9 % sodium chloride infusion, PRN  ondansetron (ZOFRAN-ODT) disintegrating tablet 4 mg, Q8H PRN   Or  ondansetron (ZOFRAN) injection 4 mg, Q6H PRN  acetaminophen (TYLENOL) tablet 650 mg, Q6H PRN   Or  acetaminophen (TYLENOL) suppository 650 mg, Q6H PRN  polyethylene glycol (GLYCOLAX) packet 17 g, Daily PRN  aspirin chewable tablet 81 mg, Daily  perflutren lipid microspheres (DEFINITY) injection 1.65 mg, ONCE PRN  potassium chloride (KLOR-CON M) extended release tablet 40 mEq, PRN   Or  potassium bicarb-citric acid (EFFER-K) effervescent tablet 40 mEq, PRN   Or  potassium chloride 10 mEq/100 mL IVPB (Peripheral Line), PRN  enoxaparin (LOVENOX) injection 40 mg, BID  insulin lispro (HUMALOG) injection vial 0-12 Units, TID WC  insulin lispro (HUMALOG) injection vial 0-6 Units, Nightly  glucose chewable tablet 16 g, PRN  dextrose bolus 10% 125 mL, PRN   Or  dextrose bolus 10% 250 mL, PRN  glucagon (rDNA) injection 1 mg, PRN  dextrose 5 % solution, PRN  insulin glargine (LANTUS) injection vial 20 Units, Nightly  magnesium oxide (MAG-OX) tablet 400 mg, Daily        Allergies:  Promethazine and Vistaril [hydroxyzine pamoate]     Review of Systems:   A 14 point review of symptoms was completed. Pertinent positives were identified in the HPI. All other review of symptoms negative unless otherwise noted below.       Objective   PHYSICAL EXAM:    Vitals:    6/11/22   BP: 131/83   Pulse: 89   Resp: 20 Temp: 98.2 °F   SpO2: 94%    Weight: (!) 362 lb (164.2 kg)       General: Morbidly obese adult female in no acute distress. Pleasant and interactive on exam.  HEENT: Normocephalic, atraumatic, non-icteric, hearing intact, nares normal, mucous membranes moist.  Neck: Supple, trachea midline. No adenopathy. No thyromegaly. No JVD. Heart: Regular rate and rhythm. Normal S1 and S2. No murmurs, gallops or rubs. Lungs: Normal respiratory effort. Clear to auscultation bilaterally. No wheezes, rales, or rhonchi. Abdomen: Soft, non-tender. Normoactive bowel sounds. No masses or organomegaly. Skin: No rashes, wounds, or lesions. Pulses: 2+ and symmetric. Extremities: No clubbing, cyanosis, or edema. Musculoskeletal: Spontaneously moves all four extremities. Psych: Normal mood and affect. Neuro: Alert and oriented to person, place, and time. No focal deficits noted. Labs   CBC:   Lab Results   Component Value Date    WBC 11.5 06/11/2022    RBC 4.59 06/11/2022    HGB 12.2 06/11/2022    HCT 38.4 06/11/2022    MCV 83.7 06/11/2022    RDW 15.7 06/11/2022     06/11/2022     CMP:  Lab Results   Component Value Date     06/11/2022    K 4.8 06/11/2022    CL 97 06/11/2022    CO2 32 06/11/2022    BUN 7 06/11/2022    CREATININE <0.5 06/11/2022    GFRAA >60 06/11/2022    LABGLOM >60 06/11/2022    GLUCOSE 250 06/11/2022    CALCIUM 9.3 06/11/2022     PT/INR:  No results found for: PTINR  HgBA1c:  Lab Results   Component Value Date    LABA1C 9.9 06/10/2022     Lab Results   Component Value Date    TROPONINI <0.01 06/10/2022         Cardiac Data     Last EKG: Normal sinus rhythm, poor R wave progression in anterior leads    Echo: N/A    Stress Test: N/A    Cath: N/A      Assessment and Plan      1. Atypical chest pain - Although chest pain is atypical, patient does have multiple risk factors for coronary artery disease and further cardiac risk stratification with non-invasive stress testing is reasonable.   She has ruled out for acute coronary syndrome.  -Will arrange for GXT nuclear SPECT stress test - Will have to be completed as two-day study due to body habitus  -Can continue aspirin 81 mg daily  -Repeat EKG for any recurrent episodes of chest pain    2. Intermittent Mobitz type 1 AV block - Asymptomatic. There was initial concern for complete heart block at the [place] prior to transfer. On my review of her available rhythm strips, it appears that she has had intermittent Mobitz type 1 AV block and did have a period with 3 consecutive non-conducted P-waves, but has not had any other evidence of Mobitz type 2 or third-degree AV block.    -Will continue to avoid AV-isaura blocking agents for now  -Planning for ischemic evaluation as above  -Continue to monitor on telemetry while inpatient for evidence of high-degree AV block and pending clinical course, can arrange for additional ambulatory monitoring with cardiac event monitor at time of discharge    3. Essential hypertension  -Continue amlodipine 5 mg daily    4. Morbid obesity  -Will benefit from weight loss through both dietary modifications and regular physical exercise      Will follow-up after stress test has been completed. Thank you for allowing us to participate in the care of Libia Ku. Please call me with any questions [phone]. [facility]  [phone] [facility]  [phone] [address] [place] [address]  6/12/2022 12:18 PM      I will address the patient's cardiac risk factors and adjusted pharmacologic treatment as needed. In addition, I have reinforced the need for patient directed risk factor modification. All questions and concerns were addressed to the patient/family. Alternatives to my treatment were discussed. The note was completed using EMR. Every effort was made to ensure accuracy; however, inadvertent computerized transcription errors may be present.

## 2022-06-13 ENCOUNTER — TELEPHONE (OUTPATIENT)
Dept: CARDIOLOGY | Age: 52
End: 2022-06-13

## 2022-06-13 ENCOUNTER — APPOINTMENT (OUTPATIENT)
Dept: NUCLEAR MEDICINE | Age: 52
End: 2022-06-13
Attending: INTERNAL MEDICINE
Payer: COMMERCIAL

## 2022-06-13 VITALS
WEIGHT: 293 LBS | SYSTOLIC BLOOD PRESSURE: 134 MMHG | OXYGEN SATURATION: 96 % | TEMPERATURE: 99.1 F | BODY MASS INDEX: 44.41 KG/M2 | DIASTOLIC BLOOD PRESSURE: 88 MMHG | HEIGHT: 68 IN | RESPIRATION RATE: 18 BRPM | HEART RATE: 101 BPM

## 2022-06-13 LAB
GLUCOSE BLD-MCNC: 173 MG/DL (ref 70–99)
GLUCOSE BLD-MCNC: 193 MG/DL (ref 70–99)
GLUCOSE BLD-MCNC: 221 MG/DL (ref 70–99)
LV EF: 58 %
LVEF MODALITY: NORMAL
PERFORMED ON: ABNORMAL

## 2022-06-13 PROCEDURE — 99215 OFFICE O/P EST HI 40 MIN: CPT | Performed by: NURSE PRACTITIONER

## 2022-06-13 PROCEDURE — G0378 HOSPITAL OBSERVATION PER HR: HCPCS

## 2022-06-13 PROCEDURE — 96372 THER/PROPH/DIAG INJ SC/IM: CPT

## 2022-06-13 PROCEDURE — 3430000000 HC RX DIAGNOSTIC RADIOPHARMACEUTICAL: Performed by: INTERNAL MEDICINE

## 2022-06-13 PROCEDURE — 93228 REMOTE 30 DAY ECG REV/REPORT: CPT | Performed by: INTERNAL MEDICINE

## 2022-06-13 PROCEDURE — 6370000000 HC RX 637 (ALT 250 FOR IP): Performed by: HOSPITALIST

## 2022-06-13 PROCEDURE — A9502 TC99M TETROFOSMIN: HCPCS | Performed by: INTERNAL MEDICINE

## 2022-06-13 PROCEDURE — C8929 TTE W OR WO FOL WCON,DOPPLER: HCPCS

## 2022-06-13 RX ORDER — AMLODIPINE BESYLATE 10 MG/1
10 TABLET ORAL DAILY
Qty: 30 TABLET | Refills: 0 | Status: SHIPPED | OUTPATIENT
Start: 2022-06-14 | End: 2022-06-28 | Stop reason: DRUGHIGH

## 2022-06-13 RX ORDER — AMLODIPINE BESYLATE 5 MG/1
10 TABLET ORAL DAILY
Status: DISCONTINUED | OUTPATIENT
Start: 2022-06-14 | End: 2022-06-13 | Stop reason: HOSPADM

## 2022-06-13 RX ORDER — GLIPIZIDE 5 MG/1
5 TABLET ORAL
Qty: 30 TABLET | Refills: 0 | Status: SHIPPED | OUTPATIENT
Start: 2022-06-13 | End: 2022-07-11 | Stop reason: SDUPTHER

## 2022-06-13 RX ORDER — ATORVASTATIN CALCIUM 10 MG/1
20 TABLET, FILM COATED ORAL NIGHTLY
Status: DISCONTINUED | OUTPATIENT
Start: 2022-06-13 | End: 2022-06-13 | Stop reason: HOSPADM

## 2022-06-13 RX ORDER — ATORVASTATIN CALCIUM 20 MG/1
20 TABLET, FILM COATED ORAL NIGHTLY
Qty: 30 TABLET | Refills: 0 | Status: SHIPPED | OUTPATIENT
Start: 2022-06-13 | End: 2022-07-11 | Stop reason: SDUPTHER

## 2022-06-13 RX ORDER — ASPIRIN 81 MG/1
81 TABLET, CHEWABLE ORAL DAILY
Qty: 30 TABLET | Refills: 3 | Status: SHIPPED | OUTPATIENT
Start: 2022-06-13

## 2022-06-13 RX ADMIN — INSULIN LISPRO 2 UNITS: 100 INJECTION, SOLUTION INTRAVENOUS; SUBCUTANEOUS at 16:13

## 2022-06-13 RX ADMIN — ASPIRIN 81 MG: 81 TABLET, CHEWABLE ORAL at 16:12

## 2022-06-13 RX ADMIN — TETROFOSMIN 33.5 MILLICURIE: 1.38 INJECTION, POWDER, LYOPHILIZED, FOR SOLUTION INTRAVENOUS at 08:58

## 2022-06-13 RX ADMIN — Medication 400 MG: at 16:12

## 2022-06-13 NOTE — PLAN OF CARE
Problem: Discharge Planning  Goal: Discharge to home or other facility with appropriate resources  Outcome: Progressing     Problem: Neurosensory - Adult  Goal: Achieves stable or improved neurological status  Outcome: Progressing     Problem: Respiratory - Adult  Goal: Achieves optimal ventilation and oxygenation  Outcome: Progressing     Problem: Cardiovascular - Adult  Goal: Maintains optimal cardiac output and hemodynamic stability  Outcome: Progressing  Goal: Absence of cardiac dysrhythmias or at baseline  Outcome: Progressing     Problem: Skin/Tissue Integrity - Adult  Goal: Skin integrity remains intact  Outcome: Progressing     Problem: Genitourinary - Adult  Goal: Absence of urinary retention  Outcome: Progressing     Problem: Hematologic - Adult  Goal: Maintains hematologic stability  Outcome: Progressing

## 2022-06-13 NOTE — CONSULTS
Nutrition Education    Consult received for DM diet education. Provided pt with written and verbal instruction on DM nutrition therapy. Discussed sources of carbohydrate, carb counting, label reading, meal planning, and importance of BG control. Pt eats out multiple meals daily, encouraged preparing meals at home. Pt reports wanting to lose weight. Handouts provided on easy meals at home and cook books. No further nutrition questions or concerns at this time. · Educated on carb control diet   · Learners: Patient  · Readiness: Acceptance  · Method: Explanation and Handout  · Response: Verbalizes Understanding  · Contact name and number provided.     Lian Mojica MS, RD, LD  Contact Number: [phone]

## 2022-06-13 NOTE — PROGRESS NOTES
Pt transferred to A1. A&Ox4. VSS. Shift assessment completed. Pt denies pain at this time. Waiting on results of stress test. Call light within reach, bed in lowest position, wheels locked.

## 2022-06-13 NOTE — PROGRESS NOTES
Monitor company [employer]  Length of monitor 14 days  Monitor ordered by Nida Centeno CNP  Patch ID [id number]  Device number Crystal Isabel Centeno, EKG tech, registered and applied monitor.

## 2022-06-13 NOTE — TELEPHONE ENCOUNTER
Per Paco Marquez CNP the patient would like to follow-up at [facility]. She lives in [facility]. She was seen by Dr. New Wood and Paco Marquez CNP at [facility]. She is also wearing a 2 week [employer] monitor for intermittent AV block. She was seen for CP, hypertenstion and intermittent AV block. The patient will probably be discharged today. Please call her to schedule an appointment with a CNP. Thank You.

## 2022-06-13 NOTE — TELEPHONE ENCOUNTER
Monitor company [employer]  Length of monitor 14 days  Monitor ordered by Jaswant Burton CNP  Patch ID [id number]  Device number Marissa Royal  Kasandra Andrews, EKG tech, registered and applied monitor.

## 2022-06-13 NOTE — PROGRESS NOTES
Pt provided with discharge instructions. All questions answered. Pt is leaving with an event monitor for 2 weeks- follow up with cardiology. Prescriptions given to pt. Family to provide transportation.

## 2022-06-13 NOTE — PROGRESS NOTES
Hospitalist Progress Note      PCP: Dontae Chadwick MD    Date of Admission: 6/10/2022    Chief Complaint: Chest Pain     Subjective: no new c/o. Medications:  Reviewed    Infusion Medications    sodium chloride 25 mL/hr at 06/10/22 1900    dextrose       Scheduled Medications    amLODIPine  5 mg Oral Daily    sodium chloride flush  5-40 mL IntraVENous 2 times per day    aspirin  81 mg Oral Daily    enoxaparin  40 mg SubCUTAneous BID    insulin lispro  0-12 Units SubCUTAneous TID WC    insulin lispro  0-6 Units SubCUTAneous Nightly    insulin glargine  20 Units SubCUTAneous Nightly    magnesium oxide  400 mg Oral Daily     PRN Meds: hydrALAZINE, sodium chloride flush, sodium chloride, ondansetron **OR** ondansetron, acetaminophen **OR** acetaminophen, polyethylene glycol, perflutren lipid microspheres, glucose, dextrose bolus **OR** dextrose bolus, glucagon (rDNA), dextrose      Intake/Output Summary (Last 24 hours) at 6/13/2022 1002  Last data filed at 6/12/2022 1841  Gross per 24 hour   Intake 360 ml   Output --   Net 360 ml       Physical Exam Performed:    BP (!) 141/75   Pulse 98   Temp 98 °F (36.7 °C) (Oral)   Resp 16   Ht 5' 8\" (1.727 m)   Wt (!) 367 lb 4.6 oz (166.6 kg)   LMP 04/19/2015   SpO2 94%   BMI 55.85 kg/m²     General appearance: No apparent distress, appears stated age and cooperative. HEENT: Pupils equal, round, and reactive to light. Conjunctivae/corneas clear. Neck: Supple, with full range of motion. No jugular venous distention. Trachea midline. Respiratory:  Normal respiratory effort. Clear to auscultation, bilaterally without Rales/Wheezes/Rhonchi. Cardiovascular: Regular rate and rhythm with normal S1/S2 without murmurs, rubs or gallops. Abdomen: Soft, non-tender, non-distended with normal bowel sounds. Musculoskeletal: No clubbing, cyanosis or edema bilaterally. Full range of motion without deformity.   Skin: Skin color, texture, turgor normal.  No rashes or lesions. Neurologic:  Neurovascularly intact without any focal sensory/motor deficits. Cranial nerves: II-XII intact, grossly non-focal.  Psychiatric: Alert and oriented, thought content appropriate, normal insight  Capillary Refill: Brisk,< 3 seconds   Peripheral Pulses: +2 palpable, equal bilaterally       Labs:   Recent Labs     06/10/22  1718 06/11/22  0637   WBC 13.4* 11.5*   HGB 11.8* 12.2   HCT 37.0 38.4    399     Recent Labs     06/11/22 0637      K 4.8   CL 97*   CO2 32   BUN 7   CREATININE <0.5*   CALCIUM 9.3     No results for input(s): AST, ALT, BILIDIR, BILITOT, ALKPHOS in the last 72 hours. No results for input(s): INR in the last 72 hours.   Recent Labs     06/10/22  1718 06/10/22  2008   TROPONINI <0.01 <0.01       Urinalysis:    No results found for: Derrick Cram, BACTERIA, RBCUA, BLOODU, SPECGRAV, GLUCOSEU    Consults:    IP CONSULT TO CARDIOLOGY  IP CONSULT TO DIETITIAN      Assessment/Plan:    Active Hospital Problems    Diagnosis     CHB (complete heart block) ([facility] [I44.2]      Priority: Medium    Morbid obesity with BMI of 50.0-59.9, adult ([facility] [E66.01, Z68.43]      Priority: Medium    Acute hyperglycemia [R73.9]      Priority: Medium    HTN (hypertension) [I10]      Priority: Medium    Noncompliance with medication regimen [Z91.14]      Priority: Medium    Chest pain with moderate risk for cardiac etiology [R07.9]      Priority: Medium         Chest pain with elevated HEART score- currently asymptomatic   -Admit to telemetry floor with serial cardiac enzymes to be obtained overnight  -ASA administered in ED and to be continued at 81 mg PO QAM  -High dose statin therapy initiated overnight; obtain FLP in a.m.  -Nitropaste applied to chest wall (BP permitting)  -ECHO scheduled for a.m.  -Lexiscan nuclear stress testing  Part I 6/11 - part 2 - 6/13 .  -Therapeutic anticoagulation not indicated at this time  -Consultation placed to cardiologist , input appreciated      CHB with 4-second pause   -Patient completely asymptomatic when this occurred  - UDS- neg , thyroid studies and electrolytes - WNL   -Patient to be monitored via telemetry during stay; pacer pads to be applied to chest wall with transcutaneous pacer near bedside  -Consult placed to [facility] further recommendations  Cardio input appreciated    intermittent Mobitz type 1 AV block  No  evidence of Mobitz type 2 or third-degree AV block.    -Will continue to avoid AV-isaura blocking agents for now     HTN  -Patient admitted to telemetry floor for continuous monitoring during stay  -EKG obtained in ED reviewed personally and found to be without evidence of LAD or acute ischemia  -Patient reports that she is prescribed Norvasc but has been noncompliant for greater than 1 year  -Patient may benefit from addition of ACE RI due to suspicion for occult diabetes- consider after ischemia w/u   hydralazine prn   -ECHO scheduled      Acute hyperglycemia with BMI 54.74 - newly diagnosed DM   - ; A1c  9.9   -Patient denies being diabetic and is on no oral hypoglycemic agents  -Weight-based basal insulin initiated QHS  -PRN Humalog medium dose SSI scheduled before meals and at bedtime based on POC glucose  -Carbohydrate restriction placed on diet  -Consultation placed to Nutrition for ADA dietary education   - may need antidiabetic meds on dc         DVT Prophylaxis: LMWH    Recent Labs     06/10/22  1718 06/11/22  0637    399     Diet: Diet NPO  Code Status: Full Code      PT/OT Eval Status: not yet ordered. Dispo - Patient is likely to remain in-house at least until [facility] 13/14 June pending clinical course and subspecialty recs.        Martha Goins MD

## 2022-06-13 NOTE — PROGRESS NOTES
[facility], operated by [facility]   Daily Progress Note    Admit Date:  6/10/2022  HPI:    Anne-Marie Lehman presented with sudden onset dull substernal chest pain. She works at the [employer] in while sitting at work she developed this chest pain. Her blood pressure was checked by coworker was found to be elevated. She was seen in the ED there and given IV labetalol. Strips from the [place] [employer] were reviewed and showed intermittent Mobitz 1 AV block. 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 Subjective:  Ms. Lukas El sitting up in chair. Denies any further chest pain. Edema improved. No palpitations or lightheadedness     Objective:   Patient Vitals for the past 24 hrs:   BP Temp Temp src Pulse Resp SpO2 Weight   06/13/22 0840 (!) 141/75 98 °F (36.7 °C) Oral 98 16 94 % --   06/13/22 0309 (!) 152/95 98.9 °F (37.2 °C) Oral 97 20 93 % (!) 367 lb 4.6 oz (166.6 kg)   06/12/22 2300 (!) 168/94 99.7 °F (37.6 °C) Oral 90 20 97 % --   06/12/22 1700 (!) 174/98 98.8 °F (37.1 °C) Oral 97 20 95 % --   06/12/22 1216 (!) 161/94 98.1 °F (36.7 °C) Oral 96 20 95 % --       Intake/Output Summary (Last 24 hours) at 6/13/2022 1034  Last data filed at 6/12/2022 1841  Gross per 24 hour   Intake 360 ml   Output --   Net 360 ml     Wt Readings from Last 3 Encounters:   06/13/22 (!) 367 lb 4.6 oz (166.6 kg)   04/16/19 (!) 354 lb (160.6 kg)   05/16/16 (!) 309 lb 14.4 oz (140.6 kg)         ASSESSMENT:   1. Chest pain: resolved, negative by troponin and EKG for ACS, awaiting stress results  2. Intermittent Mobitz type I AV block: No further seen on telemetry  3. HYPERTENSION: remains elevated, has been on amlodipine in past not not been taking recently  4. Obesity  5. Newly diagnosed diabetes mellitus: A1c 9.9      PLAN:  1. Increase amlodipine from 5 mg to 10 mg daily  2. Continue aspirin 81 mg daily  3. Add atorvastatin 20 mg daily  4.  Await echocardiogram and stress test results  5. 2-week event monitor at discharge for intermittent Mobitz 1 AV block  6. Will arrange follow-up appointment at [facility] (location per patient) 3 to 4 weeks following discharge. JASMIN Gomes CNP, 6/13/2022, 10:34 AM  [address]   [phone]       Telemetry: SR 90's  NYHA: III    Physical Exam:  General:  Awake, alert, NAD  Skin:  Warm and dry  Neck:  JVP difficult to assess  Chest:  Clear to auscultation   Cardiovascular:  RRR, normal S1S2, no m/g/r  Abdomen:  Soft, nontender, +bowel sounds  Extremities:  1+ non-pitting BLE edema        Medications:    amLODIPine  5 mg Oral Daily    sodium chloride flush  5-40 mL IntraVENous 2 times per day    aspirin  81 mg Oral Daily    enoxaparin  40 mg SubCUTAneous BID    insulin lispro  0-12 Units SubCUTAneous TID WC    insulin lispro  0-6 Units SubCUTAneous Nightly    insulin glargine  20 Units SubCUTAneous Nightly    magnesium oxide  400 mg Oral Daily      sodium chloride 25 mL/hr at 06/10/22 1900    dextrose         Lab Data: Lab results independently reviewed and analyzed by myself 6/13/2022    CBC:   Recent Labs     06/10/22  1718 06/11/22  0637   WBC 13.4* 11.5*   HGB 11.8* 12.2    399     BMP:    Recent Labs     06/11/22  0637      K 4.8   CO2 32   BUN 7   CREATININE <0.5*     INR:  No results for input(s): INR in the last 72 hours. BNP:  No results for input(s): PROBNP in the last 72 hours.   Cardiac Enzymes:   Recent Labs     06/10/22  1718 06/10/22  2008   TROPONINI <0.01 <0.01     Lipids:   Lab Results   Component Value Date    TRIG 121 06/11/2022    HDL 43 06/11/2022    LDLCALC 148 06/11/2022       Cardiac Imaging:

## 2022-06-14 NOTE — DISCHARGE SUMMARY
Hospital Medicine Discharge Summary    Patient ID: Franco Hernandez      Patient's PCP: Hunter Guillen MD    Admit Date: 6/10/2022     Discharge Date: 6/13/2022      Admitting Physician: Tonya Murphy MD     Discharge Physician: Princess Baez MD     Discharge Diagnoses: Active Hospital Problems    Diagnosis     Mobitz type 1 second degree atrioventricular block [I44.1]      Priority: Medium    CHB (complete heart block) (HCC) [I44.2]      Priority: Medium    Morbid obesity with BMI of 50.0-59.9, adult (HCC) [E66.01, Z68.43]      Priority: Medium    Acute hyperglycemia [R73.9]      Priority: Medium    HTN (hypertension) [I10]      Priority: Medium    Noncompliance with medication regimen [Z91.14]      Priority: Medium    Chest pain with moderate risk for cardiac etiology [R07.9]      Priority: Medium       The patient was seen and examined on day of discharge and this discharge summary is in conjunction with any daily progress note from day of discharge.     Hospital Course:            Chest pain with elevated HEART score- currently asymptomatic   -Admit to telemetry floor with serial cardiac enzymes  -ASA administered in ED and to be continued at 81 mg PO QAM  -High dose statin therapy initiated   -ECHO ordered.  -Lexiscan nuclear stress  -Consultation placed to cardiologist , input appreciated      CHB with 4-second pause   -Patient completely asymptomatic when this occurred  - UDS - neg , thyroid studies and electrolytes - Liz Malloy  -Patient to be monitored via telemetry during stay; pacer pads to be applied to chest wall with transcutaneous pacer near bedside  -Consult placed to [facility] further recommendations  Cardio input appreciated    intermittent Mobitz type 1 AV block  No  evidence of Mobitz type 2 or third-degree AV block.    -Will continue to avoid AV-isaura blocking agents for now     HTN  -Patient reports that she is prescribed Norvasc but has been noncompliant for greater than 1 year  -Patient may benefit from addition of ACE due to suspicion for occult diabetes - consider after ischemia w/u   hydralazine prn   -ECHO ordered      Acute hyperglycemia with BMI 54.74 - newly diagnosed DM   - ; A1c  9.9   -Consultation placed to Nutrition for ADA dietary education   - started on antidiabetic meds on dc        Labs: For convenience and continuity at follow-up the following most recent labs are provided:      CBC:    Lab Results   Component Value Date    WBC 11.5 06/11/2022    HGB 12.2 06/11/2022    HCT 38.4 06/11/2022     06/11/2022       Renal:    Lab Results   Component Value Date     06/11/2022    K 4.8 06/11/2022    CL 97 06/11/2022    CO2 32 06/11/2022    BUN 7 06/11/2022    CREATININE <0.5 06/11/2022    CALCIUM 9.3 06/11/2022         Significant Diagnostic Studies    Radiology:   NM Cardiac Stress Test Nuclear Imaging   Final Result             Consults:     IP CONSULT TO CARDIOLOGY  IP CONSULT TO DIETITIAN    Disposition: Home     Condition at Discharge: Stable    Discharge Instructions/Follow-up:  w/ PCP 1-2 weeks and subspecialists as arranged. Code Status:  Full code    Activity: activity as tolerated    Diet: regular diet      Discharge Medications:     Discharge Medication List as of 6/13/2022  3:41 PM           Details   aspirin 81 MG chewable tablet Take 1 tablet by mouth daily, Disp-30 tablet, R-3Print      atorvastatin (LIPITOR) 20 MG tablet Take 1 tablet by mouth nightly, Disp-30 tablet, R-0Print      metFORMIN (GLUCOPHAGE) 1000 MG tablet Take 1 tablet by mouth 2 times daily (with meals), Disp-60 tablet, R-0Print              Details   amLODIPine (NORVASC) 10 MG tablet Take 1 tablet by mouth daily, Disp-30 tablet, R-0Print             Time Spent on discharge is more than 30 minutes in the examination, evaluation, counseling and review of medications and discharge plan.       Signed:    Jelena Patricio MD   6/14/2022      Thank you Basia Rodriguez MD for the opportunity to be involved in this patient's care. If you have any questions or concerns please feel free to contact me at [phone].

## 2022-06-20 ENCOUNTER — TELEPHONE (OUTPATIENT)
Dept: CARDIOLOGY CLINIC | Age: 52
End: 2022-06-20

## 2022-06-20 NOTE — TELEPHONE ENCOUNTER
Attempted to call pt regarding previous message. No answer. No voicemail set up.    Last saw NPDD in the hospital 6/13/2022

## 2022-06-20 NOTE — TELEPHONE ENCOUNTER
Please try to contact patient and find out if she was syncopal or symptomatic. If so, she needs to to either be seen urgently by EP provider or go to the ED.    Louis Pond, APRN - CNP

## 2022-06-20 NOTE — TELEPHONE ENCOUNTER
Spoke to pt to relay message. Pt states she has felt fine all day - no symptoms at all. Pt states she has been relaxed all day - no rigorous activity.  Pt states she was either still asleep or had just woken up at the time of the pause but she felt nothing abnormal.

## 2022-06-21 NOTE — PROGRESS NOTES
[facility] [place], operated by [facility]   Electrophysiology Consult Note              Date: 6/28/22  Patient Name: Drake Gordon  YOB: 1970    Primary Care Physician: Che Munoz MD    CHIEF COMPLAINT:   Chief Complaint   Patient presents with    New Patient    Edema     Both feet - more on the right side      HISTORY OF PRESENT ILLNESS: Drake Gordon is a 46 y.o. female who presents for evaluation for intermittent AV block. Patient has a past medical history of anemia, and HTN. The patient works at the [employer] and on 6/10/2022 she was sitting at work when she suddenly developed substernal chest pain. Patient denied lightheadedness, dizziness, palpitations or SOB. Patient stated a coworker checked her blood pressure and noted it was elevated. Patient was given IV labetalol while on continuous telemetry monitoring and a 4 second pause was noted. Consulting cardiologist with [facility] noted there was intermittent Mobitz type 1 AV block and a period of 3 consecutive non-conducted P waves. Patient was discharged with a cardiac monitor, which is not complete and resulted yet. [employer] monitor company did call on 6/20/2022 at [address] [facility] stating the patient had a 4.8 second pause. The patient was asymptomatic during this episode. Today, 6/28/2022, ECG demonstrates SR (94 BPM). Patient states she has been dealing with Unirisxitz. She was originally seeing cardiology for hypertension. Patient stated three weeks ago she felt abdominal pain and took Protonix. The pain was worse the next day at work and she stated her blood pressure was elevated and she was sent to the ER. She states they gave her medication through her IV to lower her pressure and they obtained the EKG. She states they told her she was in Mobitz block. Her  states she snores. Patient stated since her hospital admission she has bilateral lower leg edema. Patient denies current sob, dizziness or syncope.      Past Medical History:   has a past medical history of Anemia, Hypertension, and Respiratory infection. Past Surgical History:   has a past surgical history that includes Breast surgery; Appendectomy; and Hysterectomy, total abdominal (5/14/2015). Allergies:  Promethazine and Vistaril [hydroxyzine pamoate]    Social History:   reports that she has never smoked. She has never used smokeless tobacco. She reports that she does not drink alcohol and does not use drugs. Family History: family history includes Arthritis in an other family member; High Blood Pressure in her father, mother, and another family member. Home Medications:    Prior to Admission medications    Medication Sig Start Date End Date Taking? Authorizing Provider   magnesium 30 MG tablet Take by mouth daily   Yes Historical Provider, MD   aspirin 81 MG chewable tablet Take 1 tablet by mouth daily 6/13/22  Yes Chun Sifuentes MD   atorvastatin (LIPITOR) 20 MG tablet Take 1 tablet by mouth nightly 6/13/22 7/13/22 Yes Chun Sifuentes MD   amLODIPine (NORVASC) 10 MG tablet Take 1 tablet by mouth daily 6/14/22 7/14/22 Yes Chun Sifuentes MD   glipiZIDE (GLUCOTROL) 5 mg tablet Take 1 tablet by mouth 2 times daily (before meals) 6/13/22  Yes Chun Sifuentes MD       REVIEW OF SYSTEMS:    All 14-point review of systems are completed and  pertinent positives are mentioned in the history of present illness. Other  systems are reviewed and are negative. Physical Examination:    BP (!) 142/72   Pulse 100   Ht 5' 8\" (1.727 m)   Wt (!) 360 lb (163.3 kg)   LMP 04/19/2015   SpO2 97%   BMI 54.74 kg/m²      Constitutional and General Appearance:    alert, cooperative, no distress and appears stated age  [de-identified]:    PERRLA, no cervical lymphadenopathy. No masses palpable.  Normal oral  mucosa  Respiratory:  · Normal excursion and expansion without use of accessory muscles  · Resp Auscultation: Normal breath sounds without dullness or wheezing  Cardiovascular:  · The apical impulse is not displaced  · RRR S1S2 w/o M/G/R  Abdomen:  · No masses or tenderness  · Bowel sounds present  Extremities:  ·  No Cyanosis or Clubbing  ·  Lower extremity edema: Yes  · Skin: Warm and dry  Neurological:  · Alert and oriented. · Moves all extremities well  · No abnormalities of mood, affect, memory, mentation, or behavior are noted    DATA:    ECG 6/28/22: Personally reviewed. Echo 6/13/2022  Summary   Technically difficult study due to body surface area and poor acoustic   window. Normal LV systolic function with EF of 55-60%. Endocardium not entirely well visualized but no obvious segmental wall   motion abnormalities. Normal left ventricular size with mild concentric left ventricular   hypertrophy. Normal diastolic function. Valves are not well visualized but there is no obvious structural   abnormality or significant color flow abnormality noted on doppler. No meaningful conclusion on valves can be made based on TDS. No tricuspid regurgitation to estimate systolic pulmonary artery pressure   (SPAP). Stress Test 6/13/2022  Summary Technically difficult study due to motion artifact and heterogeneous tracer  uptake  Normal LVEF >60% Normal wall motion  Moderate area of anterolateral ischemia     IMPRESSION:    1. Transient complete heart block (nocturnal). 6/28/2022  Patient is a pleasant 46year old  female with a medical history significant for hypertension, and morbid obesity who presents from home to establish care after her cardiac monitor showed asympatomatic transient complete heart block. Event occurred while patient was asleep on her recliner. There was P wave slowing before event and then complete heart block. I suspect vagal in nature. No pacing indication but will refer for SHALA testing.  - Referral for SHALA testing.  - Continue to follow along with us in 2 months. 2. Hypertension. Patient remains hypertensive.   She is having some lower extremity edema since increase in amlodipine. I will ask her to decrease amlodipine to 5 mg daily and start HCTZ for swelling and hypertension.  - Decrease amlodipine to 5 mg daily.  - Start HCTZ.  - Get new blood pressure cuff and monitor at home. RECOMMENDATIONS:  1. Referral to pulmonologist to assess for sleep apnea  2. Decrease Norvasc to 5 mg daily  3. Start hydrochlorothiazide 25 mg daily. 4. Monitor your blood pressure at home and keep a record to bring to your next visit. 5. Follow up 2 months with [facility]. QUALITY MEASURES  1. Tobacco Cessation Counseling: NA  2. Retake of BP if >140/90:   Yes  3. Documentation to PCP/referring for new patient:  Sent to PCP at close of office visit  4. CAD patient on anti-platelet: NA  5. CAD patient on STATIN therapy:  NA  6. Patient with CHF and aFib on anticoagulation:  NA     All questions and concerns were addressed to the patient/family. Alternatives to my treatment were discussed. Dr. Annalise Reyes MD  [facility]. [address] [place] [address]. [address]. [place], [address]  Phone: [phone]  Fax: [phone]     NOTE: This report was transcribed using voice recognition software. Every effort was made to ensure accuracy, however, inadvertent computerized transcription errors may be present. Selina Goltz, RN, am scribing for and in the presence of Dr. Neil Rodgers. 06/28/22 4:51 PM   Shaunna Denver, RN    I reviewed with the resident the medical history and the resident's findings on the physical examination. I discussed with the resident the patient's diagnosis and concur with the plan.

## 2022-06-21 NOTE — TELEPHONE ENCOUNTER
6/21/22 No answer no vm setup at [phone].  Tried calling pt to schedule with JER as new pt for 6/28/22 at 345pm per RNAD

## 2022-06-21 NOTE — TELEPHONE ENCOUNTER
She would be a new patient. Patient was asymptomatic with pause during the day time. OK to overbook [facility] June 28th at 3:45PM. Please call patient.

## 2022-06-28 ENCOUNTER — OFFICE VISIT (OUTPATIENT)
Dept: CARDIOLOGY CLINIC | Age: 52
End: 2022-06-28
Payer: COMMERCIAL

## 2022-06-28 VITALS
BODY MASS INDEX: 44.41 KG/M2 | DIASTOLIC BLOOD PRESSURE: 72 MMHG | HEIGHT: 68 IN | SYSTOLIC BLOOD PRESSURE: 142 MMHG | OXYGEN SATURATION: 97 % | WEIGHT: 293 LBS | HEART RATE: 100 BPM

## 2022-06-28 DIAGNOSIS — I49.9 IRREGULAR HEART RATE: Primary | ICD-10-CM

## 2022-06-28 DIAGNOSIS — I44.1 MOBITZ TYPE 1 SECOND DEGREE ATRIOVENTRICULAR BLOCK: ICD-10-CM

## 2022-06-28 DIAGNOSIS — I44.2 CHB (COMPLETE HEART BLOCK) (HCC): ICD-10-CM

## 2022-06-28 PROCEDURE — 93000 ELECTROCARDIOGRAM COMPLETE: CPT | Performed by: INTERNAL MEDICINE

## 2022-06-28 PROCEDURE — 99204 OFFICE O/P NEW MOD 45 MIN: CPT | Performed by: INTERNAL MEDICINE

## 2022-06-28 RX ORDER — HYDROCHLOROTHIAZIDE 25 MG/1
25 TABLET ORAL EVERY MORNING
Qty: 90 TABLET | Refills: 1 | Status: SHIPPED | OUTPATIENT
Start: 2022-06-28

## 2022-06-28 RX ORDER — AMLODIPINE BESYLATE 10 MG/1
5 TABLET ORAL DAILY
Qty: 15 TABLET | Refills: 0 | Status: SHIPPED
Start: 2022-06-28 | End: 2022-07-28

## 2022-06-28 RX ORDER — MAGNESIUM 30 MG
TABLET ORAL DAILY
COMMUNITY

## 2022-06-28 RX ORDER — AMLODIPINE BESYLATE 5 MG/1
5 TABLET ORAL DAILY
Qty: 90 TABLET | Refills: 3 | Status: SHIPPED | OUTPATIENT
Start: 2022-06-28

## 2022-06-28 NOTE — PATIENT INSTRUCTIONS
RECOMMENDATIONS:  1. Referral to pulmonologist to assess for sleep apnea  2. Decrease Norvasc to 5 mg daily  3. Start hydrochlorothiazide 25 mg daily. 4. Monitor your blood pressure at home and keep a record to bring to your next visit. 5. Follow up 2 months with CHRIST[facility]NA[facility].

## 2022-06-30 NOTE — TELEPHONE ENCOUNTER
Attempted to call pt. Unable to LVM. Both of these Rx's were filled by Dr. Megan Park on 6/13/22 - sent to [facility] in [facility]. Please inform pt and have her reach out to Dr. Kel Stevens for further info.

## 2022-06-30 NOTE — TELEPHONE ENCOUNTER
[facility]  Physical Therapy Initial Evaluation - Lumbar    Name: Jyoti Blue  Clinic Number: [id number]    Therapy Diagnosis:   Encounter Diagnoses   Name Primary?    Chronic midline low back pain with right-sided sciatica     Lumbar pain      Physician: Tarik Dickerson MD    Physician Orders: PT Eval and Treat   Medical Diagnosis from Referral: M54.41,G89.29 (ICD-10-CM) - Chronic midline low back pain with right-sided sciatica  Evaluation Date: 6/23/2020  Authorization Period Expiration: 6/30/20  Plan of Care Expiration: 9/22/2020 or 12th Visit  Visit # / Visits authorized: 1/ 1    Time In: 1145  Time Out: 1230  Total Billable Time: 45 minutes    Precautions: Standard and Fall    Subjective   Date of onset: Injury  occurred on 4 years prior to evaluation   History of current condition - Jyoti is a 58 y.o. year old female who reports: Pt reports slipping on wet floor falling onto her back R>L. Pt reports multiple bouts of therapy to relieve the signs and symptoms to no avail. Pt reports a constant pain of the lumbar spine with radiating pain to the right foot that has reduce tolerance for functional activity. Pt reports pain with transitions.     Mechanism of Injury: fall   Next MD Visit: TBD     Medical History:   Past Medical History:   Diagnosis Date    Allergy     Anemia     Hyperlipidemia        Surgical History:   Jyoti Blue  has a past surgical history that includes ovary removed; Ovary surgery; Dilation and curettage of uterus; and Breast surgery.    Medications:   Jyoti has a current medication list which includes the following prescription(s): aspirin, azelastine, cholecalciferol (vitamin d3), cyclobenzaprine, diclofenac sodium, fluticasone propionate, naproxen, and pravastatin.    Allergies:   Review of patient's allergies indicates:  No Known Allergies     Imaging: will present during f/u visit     Prior Therapy: Land based therapy received for  Refill  atorvastatin (LIPITOR) 20 MG tablet    glipiZIDE (GLUCOTROL) 5 mg tablet [    [address] 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)  Hobbies/Exercise: dancing   Hand Dominance: right  Prior Level of Function: Modified Independent  Current Level of Function: Modified Independent secondary to lumbar pain     Pain:  Current 7/10, worst 7/10, best 6/10  Location: right back   Description: Aching, Shooting and constant   Aggravating Factors: transitional pain   Easing Factors: pain medication    Pts goals: improve stair tolerance     Objective     Posture: Fair  Palpation: mild generalized muscle tenderness  Sensation: intact to light touch  Pelvic Observation: L/R Up-slip ; L/R anterior or posterior Rotation     Range of Motion/Strength:     Lumbar  Pain/Dysfunction with Movement   AROM     Flexion (60)  80°     Extension (35)  30°      Right side bend (45)  30°     Left side bend (45)  20°  Tightness right paraspinal   Right  Rotation (50)  30°     Left Rotation (50)  40°             Lumbar/LE MMT Left Right Pain/Dysfunction with Movement   Hip  Flexion 4/5 3+/5    Hip Extension 4/5 4-/5    Hip Abduction 4-/5 3+/5    Hip Adduction 4/5 4/5    Hip IR   4/5 4-/5    Hip ER   4/5 4-/5    Knee Flexion  4/5 4/5    Knee Extension  4/5 4/5    Lumbar Flexion  4-/5     Lumbar Extension  4-/5       Lumbar Segment Joint Mobility Comments   L1 2+    L2 2+    L3 2+    L4 2+    L5 2+        Joint Mobility       Special Tests Left Right Comments   SLR negative positive    JESS negative negative    Piriformis  negative positive    Quadrant positive     Slump  positive     Flexibility       Hamstrings  35 degrees  50 degrees       Gait Analysis   Yabbedoo ambulated 50 feet with none device with independence assistance. Yabbedoo displays increased IR  gait deviation during 1 gait cycle.      Other:   Sit to Stand - 5 Reps. Completed in 30 sec.        CMS Impairment/Limitation/Restriction for  FOTO Lumbar Survey    Therapist reviewed FOTO scores for Jyoti Blue on 6/23/2020.   FOTO documents entered into [employer] - see Media section.    Limitation Score: See scanned media   Category: Mobility         TREATMENT   Treatment Time In: 1215  Treatment Time Out: 1230  Total Treatment time separate from Evaluation: 15 minutes    Jyoti received therapeutic exercises to develop strength, ROM and flexibility for 15 minutes including:  Single knee to chest  Piriformis stretch   LTR  PPT   Sciatic nerve glide  SLR  Long sitting HSS  Home Exercises and Patient Education Provided    Education provided:   Patient was provided educational information regarding: role of Physical Therapy, short and long term goals, patient/therapist expectations, scheduling, and attendance policy.    Written Home Exercises Provided: yes  Exercises were reviewed and Jyoti was able to demonstrate them prior to the end of the session.  Jyoti demonstrated good  understanding of the education provided.     See EMR under: Patient Instructions for exercises provided 6/23/2020.    Assessment     Jyoti is a 58 y.o. female referred to outpatient Physical Therapy with a medical diagnosis of M54.41,G89.29 (ICD-10-CM) - Chronic midline low back pain with right-sided sciatica. Pt presents with displays of weakness, gait instability, pain and decreased ROM. Pt currently presents with limited ROM and strength of the right hip abductors displays gait deviations as a result. Pt reports sciatic nerve pain that radiates to foot especially with driving.     Pt prognosis is Fair.   Pt will benefit from skilled outpatient Physical Therapy to address the deficits stated above and in the chart below, provide pt/family education, and to maximize pt's level of independence.     Plan of care discussed with patient: Yes  Pt's spiritual, cultural and educational needs considered and patient is agreeable to the plan of care and goals as stated below:      Anticipated Barriers for therapy: chronic condition     Medical Necessity is demonstrated by the following  History  Co-morbidities and personal factors that may impact the plan of care Co-morbidities:   chronic condition    Personal Factors:   no deficits     low   Examination  Body Structures and Functions, activity limitations and participation restrictions that may impact the plan of care Body Regions:   back    Body Systems:    ROM  strength  balance  gait    Participation Restrictions:   none    Activity limitations:   Learning and applying knowledge  no deficits    General Tasks and Commands  no deficits    Communication  no deficits    Mobility  lifting and carrying objects    Self care  no deficits    Domestic Life  doing house work (cleaning house, washing dishes, laundry)    Interactions/Relationships  no deficits    Life Areas  No deficits    Community and Social Life  community life  no deficits         low   Clinical Presentation stable and uncomplicated low   Decision Making/ Complexity Score: low     Goals:      Long Term Goals: 6 weeks     Goal       Status     1. Pt. to report decreased lumbar pain </ =  2/10 at worst to increase tolerance for upright unsupported sitting posture.    2. Pt. to demonstrate proper posture requiring minimum verbal cues from PT for improved posture positioning     3. Increase lumbar side bending  AROM to 40 degrees to promote greater ease with self-care.    4. Increase lumbar rotation AROM to 45 degrees to promote greater ease with driving.    5. Pt. to demonstrate increased MMT for rectus abdominus to 4/5 to improve supine to sitting transfer.    6. Pt. to demonstrate increased MMT for Lumbar extensor paraspinals to 4/5 to improve tolerance for ADL and work activities.     7. Pt to be independent with HEP to improve ROM and independence with ADL's        Plan   Plan of care Certification: 6/23/2020 to 9/22/2020.    Outpatient Physical Therapy 2 times weekly for 6  weeks to include the following interventions: Manual Therapy, Patient Education, Therapeutic Activites and Therapeutic Exercise.     Robe Gil, PT,DPT

## 2022-07-07 ENCOUNTER — TELEPHONE (OUTPATIENT)
Dept: CARDIOLOGY CLINIC | Age: 52
End: 2022-07-07

## 2022-07-07 NOTE — TELEPHONE ENCOUNTER
Informed patient of cardiac event monitor results. All pauses/AV block occurred with sleeping- patient feels well. She will keep her appt with [facility],[address] for 2 months. She will call if anything changes between now and then.  She will call and schedule her sleep study

## 2022-07-08 DIAGNOSIS — I44.2 CHB (COMPLETE HEART BLOCK) (HCC): ICD-10-CM

## 2022-07-11 ENCOUNTER — TELEPHONE (OUTPATIENT)
Dept: CARDIOLOGY CLINIC | Age: 52
End: 2022-07-11

## 2022-07-11 RX ORDER — GLIPIZIDE 5 MG/1
5 TABLET ORAL
Qty: 30 TABLET | Refills: 0 | Status: SHIPPED | OUTPATIENT
Start: 2022-07-11

## 2022-07-11 RX ORDER — ATORVASTATIN CALCIUM 20 MG/1
20 TABLET, FILM COATED ORAL NIGHTLY
Qty: 30 TABLET | Refills: 0 | Status: SHIPPED | OUTPATIENT
Start: 2022-07-11 | End: 2022-09-01

## 2022-07-11 NOTE — TELEPHONE ENCOUNTER
Medication Refill    Medication needing refilled:atorvastatin (LIPITOR) 20 MG tablet         Dosage of the medication: 20 mg     How are you taking this medication (QD, BID, TID, QID, PRN): Take 1 tablet by mouth nightly     30 or 90 day supply called in: 30     When will you run out of your medication: now     Which Pharmacy are we sending the medication to?:   [facility] [address] [place] [address], [address] [place] [address]  [phone]              Medication Refill    Medication needing refilled: glipiZIDE (GLUCOTROL) 5 mg tablet     Dosage of the medication: 5 mg     How are you taking this medication (QD, BID, TID, QID, PRN): Take 1 tablet by mouth 2 times daily (before meals    30 or 90 day supply called in: 30     When will you run out of your medication: out now     Which Pharmacy are we sending the medication to?:  [address], [address] [place] [address]  [phone]

## 2022-07-11 NOTE — TELEPHONE ENCOUNTER
Spoke with pt, informed pt that the atorvastatin and glipizide are not normally medications that [address] refills. Told pt it would be best to have PCP refill. Pt stated that she doesn't have a PCP. I asked pt if she planned on getting one she stated eventually she will have too. I once again informed pt that these medications are normally filled by PCP.

## 2022-07-12 NOTE — TELEPHONE ENCOUNTER
Pt calling in to ask again about this medication & to also relay that she will be establishing with a new PCP as of 07/29/22. I relayed [address] prior message.

## 2022-07-13 DIAGNOSIS — I47.1 SVT (SUPRAVENTRICULAR TACHYCARDIA) (HCC): Primary | ICD-10-CM

## 2022-08-18 ENCOUNTER — HOSPITAL ENCOUNTER (OUTPATIENT)
Dept: MAMMOGRAPHY | Age: 52
Discharge: HOME | End: 2022-08-18
Payer: COMMERCIAL

## 2022-08-18 VITALS — HEIGHT: 68 IN | BODY MASS INDEX: 44.41 KG/M2 | WEIGHT: 293 LBS

## 2022-08-18 DIAGNOSIS — Z12.31 VISIT FOR SCREENING MAMMOGRAM: ICD-10-CM

## 2022-08-18 PROCEDURE — 77063 BREAST TOMOSYNTHESIS BI: CPT

## 2022-09-01 ENCOUNTER — OFFICE VISIT (OUTPATIENT)
Dept: ENT CLINIC | Age: 52
End: 2022-09-01
Payer: COMMERCIAL

## 2022-09-01 VITALS — HEART RATE: 64 BPM | SYSTOLIC BLOOD PRESSURE: 140 MMHG | DIASTOLIC BLOOD PRESSURE: 81 MMHG | TEMPERATURE: 67.2 F

## 2022-09-01 DIAGNOSIS — R68.89 SENSATION OF FOREIGN BODY: Chronic | ICD-10-CM

## 2022-09-01 DIAGNOSIS — R09.89 SENSATION OF FOREIGN BODY IN THROAT: Primary | Chronic | ICD-10-CM

## 2022-09-01 PROCEDURE — 31575 DIAGNOSTIC LARYNGOSCOPY: CPT | Performed by: OTOLARYNGOLOGY

## 2022-09-01 NOTE — PROGRESS NOTES
Chief Complaint   Patient presents with    Foreign Body in Nose     Sensation of a foreign body in the nose. Swallowed Foreign Body     Sensation of a foreign body in the throat at times. PROCEDURE:  FLEXIBLE FIBEROPTIC NASAL AND NASOPHARYNGOLARYNGOSCOPY  INDICATION:  Inadequate visualization by indirect laryngoscopy mirror examination and need for detailed endoscopic examination of the nasal cavity, [id number], and larynx to evaluate the upper aerodigestive tract for patients complaint of a foreign object in the nose and throat that moves around. She feels there is an object in her nose, the cover of straw. She reported that she was about to use the straw and went through a vacuum door at [facility] and the plastic tip outer covering of the straw went into her mouth and throat and then up into her nose and never came out and she can feel it moves around. She stated \"it gives me cold. \"       She stated a second time that the object is a plastic cover piece from a straw that got up into her nose through her mouth when she was at [facility] and walked through a vacuum door. This caused the object to go into her mouth and then up into the back of her nose. She stated that she feels this object in there and then it moves around and goes from side to side and goes down into her throat causing trouble swallowing and breathing and then goes back up into her nose. I advised her that this was the same story that she reported to me back in 2016 about 6 years ago. She initially did not recall that she had given me the story before. In addition, she stated that she was never scoped before even though I scoped her 2 times in 2016 because of the same complaint. I also saw her in 2019 for complaint of a foreign body in her throat and at that time she was to return for a repeat flexible fiberoptic laryngoscopy to evaluate for foreign body but she did not return at that time.   She also seemed to not recall larynx. The [employer] video system was used. After adequate endoscopic visualization, the endoscope was removed. The patient appeared to tolerate the procedure well with no evidence of perioperative complications. ESTELA / Franco Ahmadi / Prince Thompson was seen today for foreign body in nose and swallowed foreign body. Diagnoses and all orders for this visit:    Sensation of foreign body in throat  -     CT SOFT TISSUE NECK W CONTRAST    Sensation of foreign body  Comments:  in the nose  Orders:  -     CT SINUS WO CONTRAST    Other orders  -     Cancel: CT SINUS [address] / PLAN    I gave patient my reassurance that I see no evidence of foreign body or any other abnormality in the nose, nasopharynx, pharynx or larynx. Will call patient with results of the CT scans. Return if symptoms worsen or, for any further ENT or sinus problems or symptoms.

## 2022-12-01 ENCOUNTER — TELEPHONE (OUTPATIENT)
Dept: ENT CLINIC | Age: 52
End: 2022-12-01

## 2022-12-01 NOTE — TELEPHONE ENCOUNTER
Patient states she was suppose to get imagining done for an obstruction in her nose. She called central scheduling and they do not see the order.

## 2022-12-08 ENCOUNTER — TELEPHONE (OUTPATIENT)
Dept: ENT CLINIC | Age: 52
End: 2022-12-08

## 2022-12-08 DIAGNOSIS — R09.89 SENSATION OF FOREIGN BODY IN THROAT: Primary | ICD-10-CM

## 2022-12-19 ENCOUNTER — HOSPITAL ENCOUNTER (OUTPATIENT)
Dept: CT IMAGING | Age: 52
Discharge: HOME | End: 2022-12-19
Payer: COMMERCIAL

## 2022-12-19 DIAGNOSIS — R09.89 SENSATION OF FOREIGN BODY IN THROAT: ICD-10-CM

## 2022-12-19 PROCEDURE — 70491 CT SOFT TISSUE NECK W/DYE: CPT

## 2022-12-19 PROCEDURE — 6360000004 HC RX CONTRAST MEDICATION: Performed by: OTOLARYNGOLOGY

## 2022-12-19 RX ADMIN — IOPAMIDOL 75 ML: 755 INJECTION, SOLUTION INTRAVENOUS at 17:14

## 2023-01-05 ENCOUNTER — TELEPHONE (OUTPATIENT)
Dept: ENT CLINIC | Age: 53
End: 2023-01-05

## 2023-01-05 NOTE — TELEPHONE ENCOUNTER
Pt.would like the results of her CT scan done on 12/19/22 she states the object is in her ear is so painful needs to come out. She would like a call back. I informed the patient I will send a message to the team to see if they would like to bring her in sooner.

## 2023-02-08 ENCOUNTER — TELEPHONE (OUTPATIENT)
Dept: PULMONOLOGY | Age: 53
End: 2023-02-08

## 2023-02-08 NOTE — TELEPHONE ENCOUNTER
Patient did not show for NP  with Lyssa Buck on 2/8/23. Reason:  forgot    This is patient's first no show. Patient was ano show on: na.      Patient did reschedule.   Reschedule date:  03.15 ata/Oseas

## 2023-03-15 ENCOUNTER — TELEPHONE (OUTPATIENT)
Dept: PULMONOLOGY | Age: 53
End: 2023-03-15

## 2023-03-15 NOTE — TELEPHONE ENCOUNTER
Patient did not show for NPT  with Dr. Mary Sharma on 3/15/23. Reason:  na    This is patient's first no show. Patient was ano show on: na.      Patient did not reschedule. Reschedule date:  na     3.15.23- Called pt to RS, no voicemail set up.

## 2024-02-19 ENCOUNTER — OFFICE VISIT (OUTPATIENT)
Dept: SURGERY | Age: 54
End: 2024-02-19
Payer: COMMERCIAL

## 2024-02-19 VITALS
SYSTOLIC BLOOD PRESSURE: 134 MMHG | WEIGHT: 293 LBS | HEIGHT: 68 IN | DIASTOLIC BLOOD PRESSURE: 82 MMHG | BODY MASS INDEX: 44.41 KG/M2

## 2024-02-19 DIAGNOSIS — D17.1 LIPOMA OF TORSO: Primary | ICD-10-CM

## 2024-02-19 PROCEDURE — 99202 OFFICE O/P NEW SF 15 MIN: CPT | Performed by: SURGERY

## 2024-02-19 PROCEDURE — 3075F SYST BP GE 130 - 139MM HG: CPT | Performed by: SURGERY

## 2024-02-19 PROCEDURE — 3079F DIAST BP 80-89 MM HG: CPT | Performed by: SURGERY

## 2024-02-19 RX ORDER — SODIUM CHLORIDE 0.9 % (FLUSH) 0.9 %
5-40 SYRINGE (ML) INJECTION EVERY 12 HOURS SCHEDULED
OUTPATIENT
Start: 2024-02-19

## 2024-02-19 RX ORDER — SODIUM CHLORIDE 0.9 % (FLUSH) 0.9 %
5-40 SYRINGE (ML) INJECTION PRN
OUTPATIENT
Start: 2024-02-19

## 2024-02-19 RX ORDER — SODIUM CHLORIDE 9 MG/ML
INJECTION, SOLUTION INTRAVENOUS PRN
OUTPATIENT
Start: 2024-02-19

## 2024-02-19 ASSESSMENT — ENCOUNTER SYMPTOMS
CHEST TIGHTNESS: 0
EYE DISCHARGE: 0
EYE ITCHING: 0
APNEA: 0
BACK PAIN: 0
COLOR CHANGE: 0
ABDOMINAL PAIN: 0
ABDOMINAL DISTENTION: 0

## 2024-02-19 NOTE — PROGRESS NOTES
General: Skin is warm and dry.   Neurological:      Mental Status: She is alert and oriented to person, place, and time.   Psychiatric:         Behavior: Behavior normal.       /82   Ht 1.727 m (5' 8\")   Wt (!) 155.6 kg (343 lb)   LMP 04/19/2015   BMI 52.15 kg/m²   Approximately 10 x 6 cm soft tissue mass of the mid lateral right back    :      53-year-old female who presents for evaluation of a soft tissue mass of the lateral right mid back.  The mass has been present for about 20 years and has increased in size over time.  The mass is uncomfortable when palpated.  Physical examination reveals an approximately 10 x 6 cm soft tissue mass consistent with a lipoma.      Plan:      Excision of lipoma of the back.

## 2024-02-20 ENCOUNTER — PREP FOR PROCEDURE (OUTPATIENT)
Dept: SURGERY | Age: 54
End: 2024-02-20

## 2024-02-20 PROBLEM — D17.9 LIPOMA: Status: ACTIVE | Noted: 2024-02-20

## 2024-03-20 RX ORDER — ALBUTEROL SULFATE 90 UG/1
2 AEROSOL, METERED RESPIRATORY (INHALATION) EVERY 6 HOURS PRN
COMMUNITY

## 2024-03-20 NOTE — PROGRESS NOTES
Name_______________________________________Printed:____________________  Date and time of surgery__3/21/2024______0730________________Arrival Time:__0600  main______________   1. The instructions given regarding when and if a patient needs to stop oral intake prior to surgery varies.Follow the specific instructions you were given                  _x__Nothing to eat or to drink after Midnight the night before.                   ____Carbo loading or instructions will be given to select patients-if you have been given those instructions -please do the following                           The evening before your surgery after dinner before midnight drink 40 ounces of gatorade.If you are diabetic use sugar free.  The morning of surgery drink 40 ounces of water.This needs to be finished 3 hours prior to your surgery start time.    2. Take the following pills with a small sip of water on the morning of surgery__amlodipine______________                  Do not take blood pressure medications ending in pril or sartan the feli prior to surgery or the morning of surgery. Dr De Anda's patient are not to take any medications the AM of surgery.         3. Aspirin, Ibuprofen, Advil, Naproxen, Vitamin E and other Anti-inflammatory products and supplements should be stopped for 5 -7days before surgery or as directed by your physician.   4. Check with your Doctor regarding stopping Plavix, Coumadin,Eliquis, Lovenox,Effient,Pradaxa,Xarelto, Fragmin or other blood thinners and follow their instructions.   5. Do not smoke, and do not drink any alcoholic beverages 24 hours prior to surgery.  This includes NA Beer.Refrain from the usage of any recreational drugs.   6. You may brush your teeth and gargle the morning of surgery.  DO NOT SWALLOW WATER   7. You MUST make arrangements for a responsible adult to stay on site while you are here and take you home after your surgery. You will not be allowed to leave alone or drive yourself home.  It

## 2024-03-21 ENCOUNTER — ANESTHESIA EVENT (OUTPATIENT)
Dept: OPERATING ROOM | Age: 54
End: 2024-03-21
Payer: COMMERCIAL

## 2024-03-21 ENCOUNTER — HOSPITAL ENCOUNTER (OUTPATIENT)
Age: 54
Setting detail: OUTPATIENT SURGERY
Discharge: HOME | End: 2024-03-21
Attending: SURGERY | Admitting: SURGERY
Payer: COMMERCIAL

## 2024-03-21 ENCOUNTER — TELEPHONE (OUTPATIENT)
Dept: SURGERY | Age: 54
End: 2024-03-21

## 2024-03-21 ENCOUNTER — ANESTHESIA (OUTPATIENT)
Dept: OPERATING ROOM | Age: 54
End: 2024-03-21
Payer: COMMERCIAL

## 2024-03-21 VITALS
HEART RATE: 93 BPM | WEIGHT: 293 LBS | RESPIRATION RATE: 16 BRPM | HEIGHT: 68 IN | SYSTOLIC BLOOD PRESSURE: 142 MMHG | BODY MASS INDEX: 44.41 KG/M2 | DIASTOLIC BLOOD PRESSURE: 69 MMHG | OXYGEN SATURATION: 98 % | TEMPERATURE: 97 F

## 2024-03-21 DIAGNOSIS — D17.9 LIPOMA: ICD-10-CM

## 2024-03-21 LAB
GLUCOSE BLD-MCNC: 136 MG/DL (ref 70–99)
GLUCOSE BLD-MCNC: 146 MG/DL (ref 70–99)
PERFORMED ON: ABNORMAL
PERFORMED ON: ABNORMAL

## 2024-03-21 PROCEDURE — 2500000003 HC RX 250 WO HCPCS: Performed by: NURSE ANESTHETIST, CERTIFIED REGISTERED

## 2024-03-21 PROCEDURE — 6360000002 HC RX W HCPCS

## 2024-03-21 PROCEDURE — 3600000012 HC SURGERY LEVEL 2 ADDTL 15MIN: Performed by: SURGERY

## 2024-03-21 PROCEDURE — 2580000003 HC RX 258: Performed by: SURGERY

## 2024-03-21 PROCEDURE — 88304 TISSUE EXAM BY PATHOLOGIST: CPT

## 2024-03-21 PROCEDURE — 21931 EXC BACK LES SC 3 CM/>: CPT | Performed by: SURGERY

## 2024-03-21 PROCEDURE — 3600000002 HC SURGERY LEVEL 2 BASE: Performed by: SURGERY

## 2024-03-21 PROCEDURE — 2709999900 HC NON-CHARGEABLE SUPPLY: Performed by: SURGERY

## 2024-03-21 PROCEDURE — 7100000010 HC PHASE II RECOVERY - FIRST 15 MIN: Performed by: SURGERY

## 2024-03-21 PROCEDURE — 7100000011 HC PHASE II RECOVERY - ADDTL 15 MIN: Performed by: SURGERY

## 2024-03-21 PROCEDURE — 2500000003 HC RX 250 WO HCPCS: Performed by: STUDENT IN AN ORGANIZED HEALTH CARE EDUCATION/TRAINING PROGRAM

## 2024-03-21 PROCEDURE — 7100000001 HC PACU RECOVERY - ADDTL 15 MIN: Performed by: SURGERY

## 2024-03-21 PROCEDURE — 7100000000 HC PACU RECOVERY - FIRST 15 MIN: Performed by: SURGERY

## 2024-03-21 PROCEDURE — 6360000002 HC RX W HCPCS: Performed by: SURGERY

## 2024-03-21 PROCEDURE — 6370000000 HC RX 637 (ALT 250 FOR IP)

## 2024-03-21 PROCEDURE — 3700000001 HC ADD 15 MINUTES (ANESTHESIA): Performed by: SURGERY

## 2024-03-21 PROCEDURE — 6360000002 HC RX W HCPCS: Performed by: NURSE ANESTHETIST, CERTIFIED REGISTERED

## 2024-03-21 PROCEDURE — A4217 STERILE WATER/SALINE, 500 ML: HCPCS | Performed by: SURGERY

## 2024-03-21 PROCEDURE — 3700000000 HC ANESTHESIA ATTENDED CARE: Performed by: SURGERY

## 2024-03-21 RX ORDER — NALOXONE HYDROCHLORIDE 0.4 MG/ML
INJECTION, SOLUTION INTRAMUSCULAR; INTRAVENOUS; SUBCUTANEOUS PRN
Status: DISCONTINUED | OUTPATIENT
Start: 2024-03-21 | End: 2024-03-21 | Stop reason: HOSPADM

## 2024-03-21 RX ORDER — APREPITANT 40 MG/1
40 CAPSULE ORAL ONCE
Status: COMPLETED | OUTPATIENT
Start: 2024-03-21 | End: 2024-03-21

## 2024-03-21 RX ORDER — MIDAZOLAM HYDROCHLORIDE 1 MG/ML
INJECTION INTRAMUSCULAR; INTRAVENOUS PRN
Status: DISCONTINUED | OUTPATIENT
Start: 2024-03-21 | End: 2024-03-21 | Stop reason: SDUPTHER

## 2024-03-21 RX ORDER — LIDOCAINE HYDROCHLORIDE 20 MG/ML
INJECTION, SOLUTION INFILTRATION; PERINEURAL PRN
Status: DISCONTINUED | OUTPATIENT
Start: 2024-03-21 | End: 2024-03-21 | Stop reason: SDUPTHER

## 2024-03-21 RX ORDER — SODIUM CHLORIDE 0.9 % (FLUSH) 0.9 %
5-40 SYRINGE (ML) INJECTION PRN
Status: DISCONTINUED | OUTPATIENT
Start: 2024-03-21 | End: 2024-03-21 | Stop reason: HOSPADM

## 2024-03-21 RX ORDER — ACETAMINOPHEN 325 MG/1
TABLET ORAL
Status: COMPLETED
Start: 2024-03-21 | End: 2024-03-21

## 2024-03-21 RX ORDER — HYDROCODONE BITARTRATE AND ACETAMINOPHEN 5; 325 MG/1; MG/1
1-2 TABLET ORAL EVERY 6 HOURS PRN
Qty: 12 TABLET | Refills: 0 | Status: SHIPPED | OUTPATIENT
Start: 2024-03-21 | End: 2024-03-24

## 2024-03-21 RX ORDER — FAMOTIDINE 10 MG/ML
INJECTION, SOLUTION INTRAVENOUS PRN
Status: DISCONTINUED | OUTPATIENT
Start: 2024-03-21 | End: 2024-03-21 | Stop reason: SDUPTHER

## 2024-03-21 RX ORDER — SODIUM CHLORIDE, SODIUM LACTATE, POTASSIUM CHLORIDE, CALCIUM CHLORIDE 600; 310; 30; 20 MG/100ML; MG/100ML; MG/100ML; MG/100ML
INJECTION, SOLUTION INTRAVENOUS CONTINUOUS
Status: DISCONTINUED | OUTPATIENT
Start: 2024-03-21 | End: 2024-03-21 | Stop reason: HOSPADM

## 2024-03-21 RX ORDER — SODIUM CHLORIDE 0.9 % (FLUSH) 0.9 %
5-40 SYRINGE (ML) INJECTION EVERY 12 HOURS SCHEDULED
Status: DISCONTINUED | OUTPATIENT
Start: 2024-03-21 | End: 2024-03-21 | Stop reason: HOSPADM

## 2024-03-21 RX ORDER — CEFAZOLIN 3 G/1
INJECTION, POWDER, FOR SOLUTION INTRAVENOUS
Status: DISCONTINUED
Start: 2024-03-21 | End: 2024-03-21 | Stop reason: HOSPADM

## 2024-03-21 RX ORDER — SUCCINYLCHOLINE CHLORIDE 20 MG/ML
INJECTION INTRAMUSCULAR; INTRAVENOUS PRN
Status: DISCONTINUED | OUTPATIENT
Start: 2024-03-21 | End: 2024-03-21 | Stop reason: SDUPTHER

## 2024-03-21 RX ORDER — SODIUM CHLORIDE 9 MG/ML
INJECTION, SOLUTION INTRAVENOUS PRN
Status: DISCONTINUED | OUTPATIENT
Start: 2024-03-21 | End: 2024-03-21 | Stop reason: HOSPADM

## 2024-03-21 RX ORDER — ONDANSETRON 2 MG/ML
INJECTION INTRAMUSCULAR; INTRAVENOUS PRN
Status: DISCONTINUED | OUTPATIENT
Start: 2024-03-21 | End: 2024-03-21 | Stop reason: SDUPTHER

## 2024-03-21 RX ORDER — DEXAMETHASONE SODIUM PHOSPHATE 4 MG/ML
INJECTION, SOLUTION INTRA-ARTICULAR; INTRALESIONAL; INTRAMUSCULAR; INTRAVENOUS; SOFT TISSUE PRN
Status: DISCONTINUED | OUTPATIENT
Start: 2024-03-21 | End: 2024-03-21 | Stop reason: SDUPTHER

## 2024-03-21 RX ORDER — FENTANYL CITRATE 50 UG/ML
INJECTION, SOLUTION INTRAMUSCULAR; INTRAVENOUS PRN
Status: DISCONTINUED | OUTPATIENT
Start: 2024-03-21 | End: 2024-03-21 | Stop reason: SDUPTHER

## 2024-03-21 RX ORDER — MAGNESIUM HYDROXIDE 1200 MG/15ML
LIQUID ORAL CONTINUOUS PRN
Status: COMPLETED | OUTPATIENT
Start: 2024-03-21 | End: 2024-03-21

## 2024-03-21 RX ORDER — OXYCODONE HYDROCHLORIDE 5 MG/1
5 TABLET ORAL
Status: DISCONTINUED | OUTPATIENT
Start: 2024-03-21 | End: 2024-03-21 | Stop reason: HOSPADM

## 2024-03-21 RX ORDER — FENTANYL CITRATE 50 UG/ML
50 INJECTION, SOLUTION INTRAMUSCULAR; INTRAVENOUS EVERY 5 MIN PRN
Status: DISCONTINUED | OUTPATIENT
Start: 2024-03-21 | End: 2024-03-21 | Stop reason: HOSPADM

## 2024-03-21 RX ORDER — FAMOTIDINE 10 MG/ML
20 INJECTION, SOLUTION INTRAVENOUS ONCE
Status: COMPLETED | OUTPATIENT
Start: 2024-03-21 | End: 2024-03-21

## 2024-03-21 RX ORDER — ACETAMINOPHEN 325 MG/1
650 TABLET ORAL ONCE
Status: COMPLETED | OUTPATIENT
Start: 2024-03-21 | End: 2024-03-21

## 2024-03-21 RX ORDER — PROPOFOL 10 MG/ML
INJECTION, EMULSION INTRAVENOUS PRN
Status: DISCONTINUED | OUTPATIENT
Start: 2024-03-21 | End: 2024-03-21 | Stop reason: SDUPTHER

## 2024-03-21 RX ORDER — HYDROMORPHONE HYDROCHLORIDE 2 MG/ML
0.5 INJECTION, SOLUTION INTRAMUSCULAR; INTRAVENOUS; SUBCUTANEOUS EVERY 5 MIN PRN
Status: DISCONTINUED | OUTPATIENT
Start: 2024-03-21 | End: 2024-03-21 | Stop reason: HOSPADM

## 2024-03-21 RX ORDER — GLYCOPYRROLATE 0.2 MG/ML
INJECTION INTRAMUSCULAR; INTRAVENOUS PRN
Status: DISCONTINUED | OUTPATIENT
Start: 2024-03-21 | End: 2024-03-21 | Stop reason: SDUPTHER

## 2024-03-21 RX ORDER — ONDANSETRON 2 MG/ML
4 INJECTION INTRAMUSCULAR; INTRAVENOUS
Status: DISCONTINUED | OUTPATIENT
Start: 2024-03-21 | End: 2024-03-21 | Stop reason: HOSPADM

## 2024-03-21 RX ORDER — APREPITANT 40 MG/1
CAPSULE ORAL
Status: COMPLETED
Start: 2024-03-21 | End: 2024-03-21

## 2024-03-21 RX ORDER — BUPIVACAINE HYDROCHLORIDE 5 MG/ML
INJECTION, SOLUTION EPIDURAL; INTRACAUDAL
Status: COMPLETED | OUTPATIENT
Start: 2024-03-21 | End: 2024-03-21

## 2024-03-21 RX ORDER — ROCURONIUM BROMIDE 10 MG/ML
INJECTION, SOLUTION INTRAVENOUS PRN
Status: DISCONTINUED | OUTPATIENT
Start: 2024-03-21 | End: 2024-03-21 | Stop reason: SDUPTHER

## 2024-03-21 RX ADMIN — ROCURONIUM BROMIDE 20 MG: 10 INJECTION, SOLUTION INTRAVENOUS at 07:37

## 2024-03-21 RX ADMIN — FAMOTIDINE 20 MG: 10 INJECTION, SOLUTION INTRAVENOUS at 07:17

## 2024-03-21 RX ADMIN — ACETAMINOPHEN 650 MG: 325 TABLET ORAL at 06:42

## 2024-03-21 RX ADMIN — LIDOCAINE HYDROCHLORIDE 100 MG: 20 INJECTION, SOLUTION INFILTRATION; PERINEURAL at 07:37

## 2024-03-21 RX ADMIN — FAMOTIDINE 20 MG: 10 INJECTION INTRAVENOUS at 07:26

## 2024-03-21 RX ADMIN — FENTANYL CITRATE 50 MCG: 50 INJECTION, SOLUTION INTRAMUSCULAR; INTRAVENOUS at 07:37

## 2024-03-21 RX ADMIN — SODIUM CHLORIDE 3000 MG: 900 INJECTION INTRAVENOUS at 07:28

## 2024-03-21 RX ADMIN — GLYCOPYRROLATE 0.2 MG: 0.2 INJECTION, SOLUTION INTRAMUSCULAR; INTRAVENOUS at 07:34

## 2024-03-21 RX ADMIN — FENTANYL CITRATE 50 MCG: 50 INJECTION, SOLUTION INTRAMUSCULAR; INTRAVENOUS at 07:49

## 2024-03-21 RX ADMIN — APREPITANT 40 MG: 40 CAPSULE ORAL at 06:41

## 2024-03-21 RX ADMIN — ONDANSETRON 4 MG: 2 INJECTION INTRAMUSCULAR; INTRAVENOUS at 07:47

## 2024-03-21 RX ADMIN — DEXAMETHASONE SODIUM PHOSPHATE 4 MG: 4 INJECTION, SOLUTION INTRAMUSCULAR; INTRAVENOUS at 07:44

## 2024-03-21 RX ADMIN — SODIUM CHLORIDE: 9 INJECTION, SOLUTION INTRAVENOUS at 07:03

## 2024-03-21 RX ADMIN — SUCCINYLCHOLINE CHLORIDE 200 MG: 20 INJECTION, SOLUTION INTRAMUSCULAR; INTRAVENOUS at 07:38

## 2024-03-21 RX ADMIN — MIDAZOLAM 2 MG: 1 INJECTION INTRAMUSCULAR; INTRAVENOUS at 07:29

## 2024-03-21 RX ADMIN — PROPOFOL 200 MG: 10 INJECTION, EMULSION INTRAVENOUS at 07:37

## 2024-03-21 RX ADMIN — SUGAMMADEX 200 MG: 100 INJECTION, SOLUTION INTRAVENOUS at 08:04

## 2024-03-21 ASSESSMENT — ENCOUNTER SYMPTOMS: SHORTNESS OF BREATH: 0

## 2024-03-21 ASSESSMENT — PAIN - FUNCTIONAL ASSESSMENT: PAIN_FUNCTIONAL_ASSESSMENT: 0-10

## 2024-03-21 NOTE — ANESTHESIA PRE PROCEDURE
Date/Time     06/11/2022 06:37 AM    K 4.8 06/11/2022 06:37 AM    CL 97 06/11/2022 06:37 AM    CO2 32 06/11/2022 06:37 AM    BUN 7 06/11/2022 06:37 AM    CREATININE <0.5 06/11/2022 06:37 AM    GFRAA >60 06/11/2022 06:37 AM    LABGLOM >60 06/11/2022 06:37 AM    GLUCOSE 250 06/11/2022 06:37 AM    CALCIUM 9.3 06/11/2022 06:37 AM       POC Tests: No results for input(s): \"POCGLU\", \"POCNA\", \"POCK\", \"POCCL\", \"POCBUN\", \"POCHEMO\", \"POCHCT\" in the last 72 hours.    Coags: No results found for: \"PROTIME\", \"INR\", \"APTT\"    HCG (If Applicable):   Lab Results   Component Value Date    PREGTESTUR Negative 05/14/2015        ABGs: No results found for: \"PHART\", \"PO2ART\", \"VQD9LMF\", \"FLB7KMS\", \"BEART\", \"K7YKITWD\"     Type & Screen (If Applicable):  No results found for: \"LABABO\", \"LABRH\"    Drug/Infectious Status (If Applicable):  No results found for: \"HIV\", \"HEPCAB\"    COVID-19 Screening (If Applicable): No results found for: \"COVID19\"        Anesthesia Evaluation  Patient summary reviewed and Nursing notes reviewed   no history of anesthetic complications:   Airway: Mallampati: III  TM distance: >3 FB   Neck ROM: full  Mouth opening: > = 3 FB   Dental:    (+) edentulous      Pulmonary:normal exam  breath sounds clear to auscultation  (+)           asthma:     (-) COPD, shortness of breath and sleep apnea                          ROS comment: LPRD (laryngopharyngeal reflux disease)   Cardiovascular:  Exercise tolerance: good (>4 METS), Can walk 2 blocks w/o CP/SOB, has lost weight d/t increased exercise (and ozempic)  (+) hypertension:    (-) past MI, CAD and  BOUCHER      Rhythm: regular  Rate: normal                 ROS comment: Intermittent Mobitz type 1 av block and complete heart block - evaluated by cardiology who suspects it may be vagal in nature. No pacing req'd    Echo 2022  Conclusions      Summary   Technically difficult study due to body surface area and poor acoustic   window.   Normal LV systolic function with EF 
Transportation service

## 2024-03-21 NOTE — PROGRESS NOTES
Discharge instructions reviewed and understanding verbalized per pt/family with copy given. All home medications/new prescriptions have been reviewed, questions answered and patient/family state understanding. Medication information sheet provided for new prescriptions received when applicable       Pt discharged to home with all belongings. IV removed.

## 2024-03-21 NOTE — PROGRESS NOTES
Pt resting quietly in bed with eyes closed, awakens to voice, denies pain. VSS, O2 sats 93% on room air. Incision to right back dry and intact. Pt seen by anesthesia, phase 1 criteria met. Will transfer pt to same day for discharge.

## 2024-03-21 NOTE — H&P
Catalina Lazaro    HPI: 53 year old female here for excision of a lipoma of the back    Past Medical History:   Diagnosis Date    Anemia     Diabetes mellitus (HCC)     Hyperlipidemia     Hypertension     Respiratory infection     4/2015       Past Surgical History:   Procedure Laterality Date    APPENDECTOMY      BREAST SURGERY      reduction    HYSTERECTOMY, TOTAL ABDOMINAL (CERVIX REMOVED)  5/14/2015       Social History     Socioeconomic History    Marital status: Single     Spouse name: Not on file    Number of children: 1    Years of education: Not on file    Highest education level: Not on file   Occupational History    Occupation: Pharmacy Tech   Tobacco Use    Smoking status: Never    Smokeless tobacco: Never   Vaping Use    Vaping Use: Never used   Substance and Sexual Activity    Alcohol use: No    Drug use: No    Sexual activity: Not on file   Other Topics Concern    Not on file   Social History Narrative    Not on file     Social Determinants of Health     Financial Resource Strain: Not on file   Food Insecurity: Not on file   Transportation Needs: Not on file   Physical Activity: Not on file   Stress: Not on file   Social Connections: Not on file   Intimate Partner Violence: Not on file   Housing Stability: Not on file       Allergies:   Allergies   Allergen Reactions    Promethazine Hives    Vistaril [Hydroxyzine Pamoate] Hives       Prior to Admission medications    Medication Sig Start Date End Date Taking? Authorizing Provider   Semaglutide (OZEMPIC, 1 MG/DOSE, SC) Inject into the skin once a week Weds   Yes ProviderGisell MD   albuterol sulfate HFA (VENTOLIN HFA) 108 (90 Base) MCG/ACT inhaler Inhale 2 puffs into the lungs every 6 hours as needed for Wheezing   Yes ProviderGisell MD   Budesonide-Formoterol Fumarate (SYMBICORT IN) Inhale 1 puff into the lungs in the morning and at bedtime   Yes ProviderGisell MD   metFORMIN (GLUCOPHAGE) 500 MG tablet Take by mouth 8/18/22

## 2024-03-21 NOTE — BRIEF OP NOTE
Brief Postoperative Note      Patient: Catalina Lazaro  YOB: 1970  MRN: [id number]    Date of Procedure: 3/21/2024    Pre-Op Diagnosis Codes:     * Lipoma [D17.9]    Post-Op Diagnosis: Same       Procedure(s):  EXCISION LIPOMA BACK    Surgeon(s):  Diego Young MD    Assistant:  Surgical Assistant: Boom Castrejon RN    Anesthesia: General    Estimated Blood Loss (mL): Minimal    Complications: None    Specimens:   ID Type Source Tests Collected by Time Destination   A : Lipoma Right Flank Specimen Back SURGICAL PATHOLOGY Diego Young MD 3/21/2024 0752        Implants:  * No implants in log *      Drains: * No LDAs found *    Findings: 15 cm lipoma  This procedure was not performed to treat primary cutaneous melanoma through wide local excision      Electronically signed by Diego Young MD on 3/21/2024 at 8:06 AM

## 2024-03-21 NOTE — TELEPHONE ENCOUNTER
Pt has not taken the norco before that was prescribed to her. Delfina berumen/ the pharmacy wants to verify dosage.   Her ph# is [phone].  Please call Delfina and advise

## 2024-03-21 NOTE — DISCHARGE INSTRUCTIONS
Ice to site today   May shower   Norco as needed pain   Activity as tolerated   Leave LiquiBand secure dressing intact until follow-up   Follow-up in 2 weeks   Call for appointment       Follow up with Dr. Young with any questions, concerns, results, and an appointment after your procedure in the time period recommended. (Usually 2 weeks)        GENERAL SURGERY DISCHARGE INSTRUCTIONS    Follow your surgeons instructions.  Unless youare told otherwise, do not lift anything heavier than a gallon of milk (that is 8 lb) or so until you see your surgeon for your follow-up appointment.   Observe the operative area for signs of excessive bleeding. If needed, apply pressure, elevate if able, and contact your surgeon.  Observe the operative site for any signs of infection- such as increased pain, redness, fever greater than 101 degrees, swelling, foul odor or drainage. Contact your surgeon if any of these symptoms are present.  Keep operative site clean and dry.  If glued, you may shower in 24 hours.  Do not remove dressing, if present, unless instructed to by surgeon.  Avoid pulling, pushing, or tugging at incision site.  Apply ice as directed to decrease swelling and aid in healing.  15 minutes on, 15 minutes off especially for the first two days.    If unable to urinate once you are at home within 8-10 hours, notify your surgeon or go to the Emergency Room.  If you become short of breath, call your doctor or go to the ER.  Pain medication should be taken with food.  Do not drive or operate machinery while taking narcotics.  For any problems or question call your surgeon's office.  Someone is always on call.      SEDATION DISCHARGE INSTRUCTIONS   3/21/2024    Wear your seatbelt home.  You are under the influence of drugs. Do not drink alcohol, drive, operate machinery, or make any important decisions or sign any legal documents for 24 hours  A responsible adult needs to be with you for 24 hours.  You may experience

## 2024-03-21 NOTE — ANESTHESIA POSTPROCEDURE EVALUATION
Department of Anesthesiology  Postprocedure Note    Patient: Catalian Lazaro  MRN: [id number]  YOB: 1970  Date of evaluation: 3/21/2024    Procedure Summary       Date: 03/21/24 Room / Location: [address]    Anesthesia Start: 0730 Anesthesia Stop: 0830    Procedure: EXCISION LIPOMA BACK (Right: Back) Diagnosis:       Lipoma      (Lipoma [D17.9])    Surgeons: Diego Young MD Responsible Provider: Emi Lozano MD    Anesthesia Type: general ASA Status: 3            Anesthesia Type: No value filed.    Gladis Phase I: Gladis Score: 9    Gladis Phase II: Gladis Score: 10    Anesthesia Post Evaluation    Patient location during evaluation: PACU  Patient participation: complete - patient participated  Level of consciousness: awake  Airway patency: patent  Nausea & Vomiting: no vomiting and no nausea  Cardiovascular status: hemodynamically stable  Respiratory status: acceptable  Hydration status: stable  Multimodal analgesia pain management approach  Pain management: adequate    No notable events documented.

## 2024-03-21 NOTE — PROGRESS NOTES
Continuing patient into phase 2 of care. VSS. Pt drowsy, denying incisional pain at this time. Only complaints of a sore throat. Incision CDI, ice applied. Family at the bedside.

## 2024-03-21 NOTE — PROGRESS NOTES
Teaching / education initiated regarding perioperative experience, expectations, and pain management during stay. Patient verbalized understanding.

## 2024-03-21 NOTE — PROGRESS NOTES
Pt arrived from OR to PACU, awakens to voice, denies pain at this time. VSS, O2 sats 97% on 15 L non rebreather. Incision to right back dry and intact, ice pack placed. Will monitor.

## 2024-03-22 ENCOUNTER — TELEPHONE (OUTPATIENT)
Dept: SURGERY | Age: 54
End: 2024-03-22

## 2024-03-22 NOTE — OP NOTE
difficulty and was transferred to the recovery room in stable condition.          EAGLE ROSEN MD      D:  03/21/2024 14:13:57     T:  03/21/2024 20:15:01     SHADI/MG  Job #:  [id number]     Doc#:  [id number]    CC:   Poly Barrera MD

## 2024-03-22 NOTE — TELEPHONE ENCOUNTER
----- Message from Diego Young MD sent at 3/22/2024  1:38 PM EDT -----  Please call patient with pathology results  ----- Message -----  From: Gerald Incoming Lab Results From Soft (Epic Adt)  Sent: 3/22/2024   1:01 PM EDT  To: Diego Young MD

## 2024-04-04 ENCOUNTER — OFFICE VISIT (OUTPATIENT)
Dept: SURGERY | Age: 54
End: 2024-04-04

## 2024-04-04 VITALS — BODY MASS INDEX: 52 KG/M2 | SYSTOLIC BLOOD PRESSURE: 146 MMHG | DIASTOLIC BLOOD PRESSURE: 86 MMHG | WEIGHT: 293 LBS

## 2024-04-04 DIAGNOSIS — D17.1 LIPOMA OF TORSO: Primary | ICD-10-CM

## 2024-04-04 PROCEDURE — 99024 POSTOP FOLLOW-UP VISIT: CPT | Performed by: SURGERY

## 2024-04-04 NOTE — PROGRESS NOTES
Subjective:      Patient ID: Catalina Lazaro is a 53 y.o. female.    HPI    Review of Systems    Objective:   Physical Exam  Incision healing well  Assessment:      53-year-old female status post excision of a lipoma of the right flank.  Doing well postoperatively.      Plan:      Follow-up as needed.        Diego Young MD

## 2025-02-07 ENCOUNTER — TELEPHONE (OUTPATIENT)
Dept: CARDIOLOGY CLINIC | Age: 55
End: 2025-02-07

## 2025-02-07 NOTE — TELEPHONE ENCOUNTER
Can we please get copy of EKG? Would need to be able to reference for office visit.     Patient can see EPNP if sooner availability than AGK. Next available overbook with CHRISTIANAK would be 3/18 @ 1:15PM

## 2025-02-07 NOTE — TELEPHONE ENCOUNTER
Pt stated that she had an EKG on 2/7/25 with her pcp office. Pt has a copy of EKG. The EKG did come back abn and she was advised to follow up with agk. Pt last seen agk in June 2022. Pt did note that she is having slight chest pain that has been going on for a few months. Should pt be scheduled next available with agk or eprn? (Pt would not be a new pt until June).Please advise.

## 2025-03-18 ENCOUNTER — OFFICE VISIT (OUTPATIENT)
Dept: CARDIOLOGY CLINIC | Age: 55
End: 2025-03-18
Payer: COMMERCIAL

## 2025-03-18 VITALS
BODY MASS INDEX: 44.41 KG/M2 | DIASTOLIC BLOOD PRESSURE: 74 MMHG | SYSTOLIC BLOOD PRESSURE: 144 MMHG | HEART RATE: 111 BPM | OXYGEN SATURATION: 96 % | HEIGHT: 68 IN | WEIGHT: 293 LBS

## 2025-03-18 DIAGNOSIS — I44.2 CHB (COMPLETE HEART BLOCK) (HCC): ICD-10-CM

## 2025-03-18 DIAGNOSIS — R07.9 CHEST PAIN WITH MODERATE RISK FOR CARDIAC ETIOLOGY: Primary | ICD-10-CM

## 2025-03-18 DIAGNOSIS — I44.1 MOBITZ TYPE 1 SECOND DEGREE ATRIOVENTRICULAR BLOCK: ICD-10-CM

## 2025-03-18 PROCEDURE — 3077F SYST BP >= 140 MM HG: CPT | Performed by: INTERNAL MEDICINE

## 2025-03-18 PROCEDURE — 3078F DIAST BP <80 MM HG: CPT | Performed by: INTERNAL MEDICINE

## 2025-03-18 PROCEDURE — 99214 OFFICE O/P EST MOD 30 MIN: CPT | Performed by: INTERNAL MEDICINE

## 2025-03-18 PROCEDURE — 93000 ELECTROCARDIOGRAM COMPLETE: CPT | Performed by: INTERNAL MEDICINE

## 2025-03-18 RX ORDER — SUCRALFATE 1 G/1
1 TABLET ORAL 2 TIMES DAILY
Qty: 60 TABLET | Refills: 0 | Status: SHIPPED | OUTPATIENT
Start: 2025-03-18

## 2025-03-18 RX ORDER — PANTOPRAZOLE SODIUM 40 MG/1
40 TABLET, DELAYED RELEASE ORAL
Qty: 30 TABLET | Refills: 3 | Status: SHIPPED | OUTPATIENT
Start: 2025-03-18

## 2025-03-18 NOTE — PATIENT INSTRUCTIONS
RECOMMENDATIONS:  Take 40 mg Protonix twice daily for 8 weeks.   Start Carafate for 2 weeks.   Discuss discomfort with GI doctor when you see them.   Follow up with us as needed.

## 2025-03-18 NOTE — PROGRESS NOTES
close of office visit  4. CAD patient on anti-platelet: NA  5. CAD patient on STATIN therapy:  NA  6. Patient with CHF and aFib on anticoagulation:  NA     All questions and concerns were addressed to the patient/family. Alternatives to my treatment were discussed.    Dr. JUWAN Garcia MD  Electrophysiology  [facility].  [address] [place] [address]. [address].  [place] [address]  Phone: [phone]  Fax: [phone]     NOTE: This report was transcribed using voice recognition software. Every effort was made to ensure accuracy, however, inadvertent computerized transcription errors may be present.     IMore RN, am scribing for and in the presence of Dr. Justo Garcia. 03/18/25 1:59 PM   More Kennedy RN    The scribe's documentation has been prepared under my direction and personally reviewed by me in its entirety. I confirm that the note above accurately reflects all work, physical examination, the discussion of treatments and procedures, and medical decision making performed by me.      MAL ALARCON Jr, MD personally performed the services described in this documentation as scribed by nurse in my presence, and is both accurate and complete.    Electronically signed by MAL Garcia Jr, MD on 3/18/2025 at 2:35 PM

## 2025-06-25 NOTE — PROGRESS NOTES
Patient reached ___x_ yes  _____ no         MY Chart message sent  ____no_  VM instructions left ____ yes   phone number ________                                ____ no-office notified    [address], [place]   [address]      Date _7/1________  Time ___0800____  Arrival _0630_____    Nothing to eat or drink after midnight-follow your doctors prep instructions-this may include taking a second dose of your prep after midnight    Responsible adult 18 or older to stay on site while you are here-drive you home-stay with you after    Dress comfortably-No makeup or jewlrey    Follow any instructions your doctors office has given you    Bring a complete list of all your medications and supplements including name,dose,how often taken the day of your procedure     If you normally take the following medications in the morning please do so the AM of your procedure with a small sip of water       Heart,blood pressure,seizure,thyroid or breathing medications-use your inhalers-bring any rescue inhalers with you DOS       DO NOT take blood pressure medications ending in \"shobha\" or \"pril\" the AM of procedure or evening prior    Take half or your normal dose of any long acting insulins the night before your procedure-do not take any diabetic medications the AM of procedure.     If you take a weekly injection for diabetes or weight loss-do not take one week prior to surgery/procedure.If you have already taken your injection this week,contact your surgeon. There is a possibility of cancellation if taken.  LATASHA LD 6/19    If you take any SGLT2  medications such as Jardiance ,Invokana, Synjardy or Farxiga. You need to stop these 3 days prior to surgery/procedure. If you have already taken, contact your surgeon. There is a possibility of cancellation if taken.    Follow your doctors instructions regarding stopping or taking  any blood thinners-if you do not have instructions-call them    Any questions call your

## 2025-07-01 ENCOUNTER — ANESTHESIA EVENT (OUTPATIENT)
Dept: ENDOSCOPY | Age: 55
End: 2025-07-01
Payer: COMMERCIAL

## 2025-07-01 ENCOUNTER — ANESTHESIA (OUTPATIENT)
Dept: ENDOSCOPY | Age: 55
End: 2025-07-01
Payer: COMMERCIAL

## 2025-07-01 ENCOUNTER — HOSPITAL ENCOUNTER (OUTPATIENT)
Age: 55
Setting detail: OUTPATIENT SURGERY
Discharge: HOME | End: 2025-07-01
Attending: INTERNAL MEDICINE | Admitting: INTERNAL MEDICINE
Payer: COMMERCIAL

## 2025-07-01 VITALS
HEIGHT: 68 IN | OXYGEN SATURATION: 99 % | HEART RATE: 99 BPM | TEMPERATURE: 98.2 F | SYSTOLIC BLOOD PRESSURE: 129 MMHG | DIASTOLIC BLOOD PRESSURE: 66 MMHG | RESPIRATION RATE: 18 BRPM | BODY MASS INDEX: 44.41 KG/M2 | WEIGHT: 293 LBS

## 2025-07-01 DIAGNOSIS — Z12.11 COLON CANCER SCREENING: ICD-10-CM

## 2025-07-01 LAB
GLUCOSE BLD-MCNC: 213 MG/DL (ref 70–99)
GLUCOSE BLD-MCNC: 220 MG/DL (ref 70–99)
PERFORMED ON: ABNORMAL
PERFORMED ON: ABNORMAL

## 2025-07-01 PROCEDURE — 3609010300 HC COLONOSCOPY W/BIOPSY SINGLE/MULTIPLE: Performed by: INTERNAL MEDICINE

## 2025-07-01 PROCEDURE — 7100000010 HC PHASE II RECOVERY - FIRST 15 MIN: Performed by: INTERNAL MEDICINE

## 2025-07-01 PROCEDURE — 7100000000 HC PACU RECOVERY - FIRST 15 MIN: Performed by: INTERNAL MEDICINE

## 2025-07-01 PROCEDURE — 2580000003 HC RX 258: Performed by: INTERNAL MEDICINE

## 2025-07-01 PROCEDURE — 7100000001 HC PACU RECOVERY - ADDTL 15 MIN: Performed by: INTERNAL MEDICINE

## 2025-07-01 PROCEDURE — 2500000003 HC RX 250 WO HCPCS: Performed by: NURSE ANESTHETIST, CERTIFIED REGISTERED

## 2025-07-01 PROCEDURE — 3700000001 HC ADD 15 MINUTES (ANESTHESIA): Performed by: INTERNAL MEDICINE

## 2025-07-01 PROCEDURE — 7100000011 HC PHASE II RECOVERY - ADDTL 15 MIN: Performed by: INTERNAL MEDICINE

## 2025-07-01 PROCEDURE — 6360000002 HC RX W HCPCS: Performed by: NURSE ANESTHETIST, CERTIFIED REGISTERED

## 2025-07-01 PROCEDURE — 88305 TISSUE EXAM BY PATHOLOGIST: CPT

## 2025-07-01 PROCEDURE — 3700000000 HC ANESTHESIA ATTENDED CARE: Performed by: INTERNAL MEDICINE

## 2025-07-01 PROCEDURE — 2709999900 HC NON-CHARGEABLE SUPPLY: Performed by: INTERNAL MEDICINE

## 2025-07-01 RX ORDER — SODIUM CHLORIDE 9 MG/ML
INJECTION, SOLUTION INTRAVENOUS CONTINUOUS
Status: DISCONTINUED | OUTPATIENT
Start: 2025-07-01 | End: 2025-07-01 | Stop reason: HOSPADM

## 2025-07-01 RX ORDER — LIDOCAINE HYDROCHLORIDE 20 MG/ML
INJECTION, SOLUTION INFILTRATION; PERINEURAL
Status: DISCONTINUED | OUTPATIENT
Start: 2025-07-01 | End: 2025-07-01 | Stop reason: SDUPTHER

## 2025-07-01 RX ORDER — MIDAZOLAM HYDROCHLORIDE 1 MG/ML
INJECTION, SOLUTION INTRAMUSCULAR; INTRAVENOUS
Status: DISCONTINUED | OUTPATIENT
Start: 2025-07-01 | End: 2025-07-01 | Stop reason: SDUPTHER

## 2025-07-01 RX ORDER — PROPOFOL 10 MG/ML
INJECTION, EMULSION INTRAVENOUS
Status: DISCONTINUED | OUTPATIENT
Start: 2025-07-01 | End: 2025-07-01 | Stop reason: SDUPTHER

## 2025-07-01 RX ADMIN — MIDAZOLAM 2 MG: 1 INJECTION INTRAMUSCULAR; INTRAVENOUS at 08:00

## 2025-07-01 RX ADMIN — PROPOFOL 50 MG: 10 INJECTION, EMULSION INTRAVENOUS at 07:56

## 2025-07-01 RX ADMIN — Medication 50 MG: at 08:01

## 2025-07-01 RX ADMIN — LIDOCAINE HYDROCHLORIDE 50 MG: 20 INJECTION, SOLUTION INFILTRATION; PERINEURAL at 07:55

## 2025-07-01 RX ADMIN — PROPOFOL 100 MCG/KG/MIN: 10 INJECTION, EMULSION INTRAVENOUS at 07:55

## 2025-07-01 RX ADMIN — PROPOFOL 100 MG: 10 INJECTION, EMULSION INTRAVENOUS at 07:55

## 2025-07-01 RX ADMIN — SODIUM CHLORIDE: 9 INJECTION, SOLUTION INTRAVENOUS at 07:23

## 2025-07-01 RX ADMIN — PROPOFOL 80 MCG/KG/MIN: 10 INJECTION, EMULSION INTRAVENOUS at 08:02

## 2025-07-01 ASSESSMENT — PAIN SCALES - GENERAL
PAINLEVEL_OUTOF10: 0

## 2025-07-01 NOTE — ANESTHESIA POSTPROCEDURE EVALUATION
Department of Anesthesiology  Postprocedure Note    Patient: Catalina Lazaro  MRN: [id number]  YOB: 1970  Date of evaluation: 7/1/2025    Procedure Summary       Date: 07/01/25 Room / Location: [place] [address] / [facility]    Anesthesia Start: 0751 Anesthesia Stop: 0828    Procedure: COLONOSCOPY BIOPSY Diagnosis:       Colon cancer screening      (Colon cancer screening [Z12.11])    Surgeons: Dez Velasquez MD Responsible Provider: Kain Lowe MD    Anesthesia Type: MAC ASA Status: 3            Anesthesia Type: No value filed.    Gladis Phase I: Gladis Score: 10    Gladis Phase II:      Anesthesia Post Evaluation    Patient location during evaluation: PACU  Patient participation: complete - patient participated  Level of consciousness: awake and alert  Airway patency: patent  Nausea & Vomiting: no nausea and no vomiting  Cardiovascular status: hemodynamically stable  Respiratory status: acceptable  Hydration status: euvolemic  Multimodal analgesia pain management approach  Pain management: satisfactory to patient    No notable events documented.

## 2025-07-01 NOTE — ANESTHESIA PRE PROCEDURE
Department of Anesthesiology  Preprocedure Note       Name:  Catalina Lazaro   Age:  55 y.o.  :  1970                                          MRN:  [id number]         Date:  2025      Surgeon: Surgeon(s):  Dez Velasquez MD    Procedure: Procedure(s):  COLONOSCOPY DIAGNOSTIC    Medications prior to admission:   Prior to Admission medications    Medication Sig Start Date End Date Taking? Authorizing Provider   Dulaglutide (TRULICITY SC) Inject into the skin    Yes Gisell Ohara MD   pantoprazole (PROTONIX) 40 MG tablet Take 1 tablet by mouth 2 times daily (before meals) 3/18/25  Yes MAL Garcia Jr., MD   albuterol sulfate HFA (VENTOLIN HFA) 108 (90 Base) MCG/ACT inhaler Inhale 2 puffs into the lungs every 6 hours as needed for Wheezing   Yes Gisell Ohara MD   Budesonide-Formoterol Fumarate (SYMBICORT IN) Inhale 1 puff into the lungs in the morning and at bedtime   Yes Gisell Ohara MD   glipiZIDE (GLUCOTROL) 5 MG tablet Take 1 tablet by mouth 2 times daily (before meals) 22  Yes MAL Garcia Jr., MD   hydroCHLOROthiazide (HYDRODIURIL) 25 MG tablet Take 1 tablet by mouth every morning 22  Yes MAL Garcia Jr., MD   amLODIPine (NORVASC) 5 MG tablet Take 1 tablet by mouth daily 22  Yes MAL Garcia Jr., MD   Tirzepatide (MOUNJARO SC) Inject into the skin  Patient not taking: Reported on 2025    Gisell Ohara MD   sucralfate (CARAFATE) 1 GM tablet Take 1 tablet by mouth in the morning and at bedtime  Patient not taking: Reported on 2025 3/18/25   MAL Garcia Jr., MD   Semaglutide (OZEMPIC, 1 MG/DOSE, SC) Inject into the skin once a week   Patient not taking: Reported on 3/18/2025    Gisell Ohara MD   metFORMIN (GLUCOPHAGE) 500 MG tablet Take by mouth  Patient not taking: Reported on 3/18/2025 8/18/22   Gisell Ohara MD   atorvastatin (LIPITOR) 20 MG tablet Take 1 tablet by mouth nightly 22

## 2025-07-01 NOTE — H&P
Gastroenterology Note             Pre-operative History and Physical    Patient: Catalina Lazaro  : 1970  CSN:     History Obtained From:  patient and/or guardian.     HISTORY OF PRESENT ILLNESS:    The patient is a 55 y.o. female  here for colonoscopy.  Rectal bleeding    Past Medical History:    Past Medical History:   Diagnosis Date    Anemia     Diabetes mellitus (HCC)     Hyperlipidemia     Hypertension     Respiratory infection     2015     Past Surgical History:    Past Surgical History:   Procedure Laterality Date    APPENDECTOMY      BREAST SURGERY      reduction    HYSTERECTOMY, TOTAL ABDOMINAL (CERVIX REMOVED)  2015    SKIN LESION EXCISION Right 3/21/2024    EXCISION LIPOMA BACK performed by Diego Young MD at [facility] OR     Medications Prior to Admission:   No current facility-administered medications on file prior to encounter.     Current Outpatient Medications on File Prior to Encounter   Medication Sig Dispense Refill    Dulaglutide (TRULICITY SC) Inject into the skin       pantoprazole (PROTONIX) 40 MG tablet Take 1 tablet by mouth 2 times daily (before meals) 30 tablet 3    albuterol sulfate HFA (VENTOLIN HFA) 108 (90 Base) MCG/ACT inhaler Inhale 2 puffs into the lungs every 6 hours as needed for Wheezing      Budesonide-Formoterol Fumarate (SYMBICORT IN) Inhale 1 puff into the lungs in the morning and at bedtime      glipiZIDE (GLUCOTROL) 5 MG tablet Take 1 tablet by mouth 2 times daily (before meals) 30 tablet 0    hydroCHLOROthiazide (HYDRODIURIL) 25 MG tablet Take 1 tablet by mouth every morning 90 tablet 1    amLODIPine (NORVASC) 5 MG tablet Take 1 tablet by mouth daily 90 tablet 3    Tirzepatide (MOUNJARO SC) Inject into the skin (Patient not taking: Reported on 2025)      sucralfate (CARAFATE) 1 GM tablet Take 1 tablet by mouth in the morning and at bedtime (Patient not taking: Reported on 2025) 60 tablet 0    Semaglutide (OZEMPIC, 1 MG/DOSE,

## 2025-07-01 NOTE — PROGRESS NOTES
Doing well.  Awake, alert. Respirations easy on room air.  Taking fluids without difficulty.  Friend at bedside.  Reviewed discharge instructions with patient and friend.  Questions answered, copy given.

## 2025-07-01 NOTE — DISCHARGE INSTRUCTIONS

## (undated) DEVICE — SUTURE VCRL + SZ 3-0 L18IN ABSRB UD SH 1/2 CIR TAPERCUT NDL VCP864D

## (undated) DEVICE — SUTURE VCRL + SZ 2-0 L18IN ABSRB UD CT1 L36MM 1/2 CIR VCP839D

## (undated) DEVICE — NEEDLE,22GX1.5",REG,BEVEL: Brand: MEDLINE

## (undated) DEVICE — BW-412T DISP COMBO CLEANING BRUSH: Brand: SINGLE USE COMBINATION CLEANING BRUSH

## (undated) DEVICE — ELECTRODE PT RET AD L9FT HI MOIST COND ADH HYDRGEL CORDED

## (undated) DEVICE — BLADE CLIPPER GEN PURP NS

## (undated) DEVICE — CAP WATER BTL AIR TBNG L 16 IN CO2 TBNG L 48 IN ENDOSCP

## (undated) DEVICE — SINGLE USE AIR/WATER, SUCTION AND BIOPSY VALVES SET: Brand: ORCAPOD™

## (undated) DEVICE — ENDOSCOPIC KIT 6X3/16 FT COLON W/ 1.1 OZ 2 GWN W/O BRSH

## (undated) DEVICE — FORCEPS BX L240CM WRK CHN 2.8MM STD CAP W/ NDL MIC MESH

## (undated) DEVICE — AIR/WATER CLEANING ADAPTER FOR OLYMPUS® GI ENDOSCOPE: Brand: BULLDOG®

## (undated) DEVICE — STERILE POLYISOPRENE POWDER-FREE SURGICAL GLOVES: Brand: PROTEXIS

## (undated) DEVICE — TUBING IRRIG COMPATIBLE W ERBE MEDIVATOR PMP HYDR

## (undated) DEVICE — MAJOR SET UP PK

## (undated) DEVICE — BLADE ES ELASTOMERIC COAT INSUL DURABLE BEND UPTO 90DEG

## (undated) DEVICE — SOLUTION IRRIG 1000ML 0.9% SOD CHL USP POUR PLAS BTL

## (undated) DEVICE — INTENDED FOR TISSUE SEPARATION, AND OTHER PROCEDURES THAT REQUIRE A SHARP SURGICAL BLADE TO PUNCTURE OR CUT.: Brand: BARD-PARKER ® STAINLESS STEEL BLADES

## (undated) DEVICE — PENCIL ES L3M BTTN SWCH S STL HEX LOK BLDE ELECTRD HOLSTER

## (undated) DEVICE — SUTURE MCRYL + SZ 4-0 L27IN ABSRB UD L19MM PS-2 3/8 CIR MCP426H

## (undated) DEVICE — GOWN,SIRUS,POLYRNF,BRTHSLV,XL,30/CS: Brand: MEDLINE

## (undated) DEVICE — SYRINGE, LUER LOCK, 10ML: Brand: MEDLINE

## (undated) DEVICE — DRAPE ADOLESCENT  LAPAROTOMY

## (undated) DEVICE — COVER LT HNDL BLU PLAS

## (undated) DEVICE — SOLUTION IRRIG 500ML STRL H2O NONPYROGENIC

## (undated) DEVICE — APPLICATOR MEDICATED 26 CC SOLUTION HI LT ORNG CHLORAPREP

## (undated) DEVICE — LIQUIBAND RAPID ADHESIVE 36/CS 0.8ML: Brand: MEDLINE

## (undated) DEVICE — ADHESIVE SKIN CLOSURE WND 8.661X1.5 IN 22 CM LIQUIBAND SECUR